# Patient Record
Sex: FEMALE | Race: WHITE | Employment: FULL TIME
[De-identification: names, ages, dates, MRNs, and addresses within clinical notes are randomized per-mention and may not be internally consistent; named-entity substitution may affect disease eponyms.]

---

## 2017-01-05 DIAGNOSIS — R63.4 UNINTENDED WEIGHT LOSS: ICD-10-CM

## 2017-01-05 DIAGNOSIS — Z11.4 SCREENING FOR HIV (HUMAN IMMUNODEFICIENCY VIRUS): ICD-10-CM

## 2017-01-05 DIAGNOSIS — E03.9 ACQUIRED HYPOTHYROIDISM: ICD-10-CM

## 2017-01-05 LAB
ALBUMIN SERPL-MCNC: 4.4 G/DL
ALP BLD-CCNC: 41 U/L
ALT SERPL-CCNC: 15 U/L
AST SERPL-CCNC: 17 U/L
BASOPHILS ABSOLUTE: 0 /ΜL
BASOPHILS RELATIVE PERCENT: 0.5 %
BILIRUB SERPL-MCNC: 0.9 MG/DL (ref 0.1–1.4)
BUN BLDV-MCNC: 6 MG/DL
CALCIUM SERPL-MCNC: 9.6 MG/DL
CHLORIDE BLD-SCNC: 105 MMOL/L
CO2: 27 MMOL/L
CREAT SERPL-MCNC: 0.72 MG/DL
EOSINOPHILS ABSOLUTE: 0.1 /ΜL
EOSINOPHILS RELATIVE PERCENT: 1 %
GFR CALCULATED: NORMAL
GLUCOSE BLD-MCNC: 92 MG/DL
HCT VFR BLD CALC: 40.6 % (ref 36–46)
HEMOGLOBIN: 13.9 G/DL (ref 12–16)
LYMPHOCYTES ABSOLUTE: 1 /ΜL
LYMPHOCYTES RELATIVE PERCENT: 16.9 %
MCH RBC QN AUTO: 29.5 PG
MCHC RBC AUTO-ENTMCNC: 34.2 G/DL
MCV RBC AUTO: 87 FL
MONOCYTES ABSOLUTE: 0.6 /ΜL
MONOCYTES RELATIVE PERCENT: 11.1 %
NEUTROPHILS ABSOLUTE: 4 /ΜL
NEUTROPHILS RELATIVE PERCENT: 70.5 %
PDW BLD-RTO: NORMAL %
PLATELET # BLD: 202 K/ΜL
PMV BLD AUTO: 8.7 FL
POTASSIUM SERPL-SCNC: 3.4 MMOL/L
RBC # BLD: 4.69 10^6/ΜL
SODIUM BLD-SCNC: 141 MMOL/L
TOTAL PROTEIN: 6.5
TSH SERPL DL<=0.05 MIU/L-ACNC: 1.95 UIU/ML
WBC # BLD: 5.6 10^3/ML

## 2017-01-06 DIAGNOSIS — E87.6 HYPOKALEMIA: Primary | ICD-10-CM

## 2017-01-13 ENCOUNTER — OFFICE VISIT (OUTPATIENT)
Dept: FAMILY MEDICINE CLINIC | Facility: CLINIC | Age: 27
End: 2017-01-13

## 2017-01-13 VITALS
HEIGHT: 60 IN | OXYGEN SATURATION: 100 % | SYSTOLIC BLOOD PRESSURE: 129 MMHG | HEART RATE: 101 BPM | TEMPERATURE: 97.8 F | BODY MASS INDEX: 19.99 KG/M2 | WEIGHT: 101.8 LBS | DIASTOLIC BLOOD PRESSURE: 88 MMHG

## 2017-01-13 DIAGNOSIS — Q18.3: ICD-10-CM

## 2017-01-13 DIAGNOSIS — K80.20 CALCULUS OF GALLBLADDER WITHOUT CHOLECYSTITIS WITHOUT OBSTRUCTION: ICD-10-CM

## 2017-01-13 DIAGNOSIS — Z83.79 FAMILY HISTORY OF CELIAC DISEASE: ICD-10-CM

## 2017-01-13 DIAGNOSIS — E87.6 HYPOKALEMIA: ICD-10-CM

## 2017-01-13 DIAGNOSIS — R63.4 UNINTENDED WEIGHT LOSS: ICD-10-CM

## 2017-01-13 DIAGNOSIS — E03.9 ACQUIRED HYPOTHYROIDISM: Primary | ICD-10-CM

## 2017-01-13 PROCEDURE — 99214 OFFICE O/P EST MOD 30 MIN: CPT | Performed by: FAMILY MEDICINE

## 2017-01-17 PROBLEM — R63.4 UNINTENDED WEIGHT LOSS: Status: ACTIVE | Noted: 2017-01-17

## 2017-01-17 PROBLEM — Z83.79 FAMILY HISTORY OF CELIAC DISEASE: Status: ACTIVE | Noted: 2017-01-17

## 2017-01-17 ASSESSMENT — ENCOUNTER SYMPTOMS
COUGH: 0
CHEST TIGHTNESS: 0
SHORTNESS OF BREATH: 0
ABDOMINAL PAIN: 0
NAUSEA: 0
CONSTIPATION: 0
VOMITING: 0
ABDOMINAL DISTENTION: 0
DIARRHEA: 0
WHEEZING: 0

## 2017-02-01 DIAGNOSIS — E87.6 HYPOKALEMIA: ICD-10-CM

## 2017-02-01 LAB — POTASSIUM (K+): 3.8

## 2017-04-10 DIAGNOSIS — E03.9 ACQUIRED HYPOTHYROIDISM: ICD-10-CM

## 2017-04-10 LAB — TSH SERPL DL<=0.05 MIU/L-ACNC: 2.27 UIU/ML

## 2017-04-13 ENCOUNTER — OFFICE VISIT (OUTPATIENT)
Dept: FAMILY MEDICINE CLINIC | Age: 27
End: 2017-04-13
Payer: COMMERCIAL

## 2017-04-13 VITALS
SYSTOLIC BLOOD PRESSURE: 121 MMHG | BODY MASS INDEX: 20.62 KG/M2 | OXYGEN SATURATION: 97 % | TEMPERATURE: 97.6 F | WEIGHT: 105 LBS | HEIGHT: 60 IN | HEART RATE: 91 BPM | DIASTOLIC BLOOD PRESSURE: 85 MMHG

## 2017-04-13 DIAGNOSIS — E03.9 ACQUIRED HYPOTHYROIDISM: Primary | ICD-10-CM

## 2017-04-13 DIAGNOSIS — K80.20 CALCULUS OF GALLBLADDER WITHOUT CHOLECYSTITIS WITHOUT OBSTRUCTION: ICD-10-CM

## 2017-04-13 PROCEDURE — 99213 OFFICE O/P EST LOW 20 MIN: CPT | Performed by: FAMILY MEDICINE

## 2017-04-13 ASSESSMENT — ENCOUNTER SYMPTOMS
VOMITING: 0
SHORTNESS OF BREATH: 0
ABDOMINAL DISTENTION: 0
WHEEZING: 0
NAUSEA: 0
DIARRHEA: 0
ABDOMINAL PAIN: 0
COUGH: 0
CONSTIPATION: 0
CHEST TIGHTNESS: 0

## 2017-07-31 DIAGNOSIS — E03.9 ACQUIRED HYPOTHYROIDISM: ICD-10-CM

## 2017-07-31 RX ORDER — LEVOTHYROXINE SODIUM 88 UG/1
88 TABLET ORAL DAILY
Qty: 90 TABLET | Refills: 2 | Status: SHIPPED | OUTPATIENT
Start: 2017-07-31 | End: 2018-05-09 | Stop reason: SDUPTHER

## 2017-10-07 LAB — TSH SERPL DL<=0.05 MIU/L-ACNC: 3.37 UIU/ML

## 2017-10-09 DIAGNOSIS — E03.9 ACQUIRED HYPOTHYROIDISM: ICD-10-CM

## 2017-10-12 ENCOUNTER — OFFICE VISIT (OUTPATIENT)
Dept: FAMILY MEDICINE CLINIC | Age: 27
End: 2017-10-12
Payer: COMMERCIAL

## 2017-10-12 VITALS
OXYGEN SATURATION: 97 % | SYSTOLIC BLOOD PRESSURE: 124 MMHG | BODY MASS INDEX: 19.75 KG/M2 | TEMPERATURE: 97.5 F | WEIGHT: 100.6 LBS | HEIGHT: 60 IN | DIASTOLIC BLOOD PRESSURE: 87 MMHG | HEART RATE: 90 BPM

## 2017-10-12 DIAGNOSIS — R63.4 UNINTENDED WEIGHT LOSS: Primary | ICD-10-CM

## 2017-10-12 DIAGNOSIS — K80.20 CALCULUS OF GALLBLADDER WITHOUT CHOLECYSTITIS WITHOUT OBSTRUCTION: ICD-10-CM

## 2017-10-12 DIAGNOSIS — Z23 NEEDS FLU SHOT: ICD-10-CM

## 2017-10-12 DIAGNOSIS — E03.9 ACQUIRED HYPOTHYROIDISM: ICD-10-CM

## 2017-10-12 DIAGNOSIS — S01.00XS: ICD-10-CM

## 2017-10-12 PROCEDURE — 99214 OFFICE O/P EST MOD 30 MIN: CPT | Performed by: FAMILY MEDICINE

## 2017-10-12 PROCEDURE — 90688 IIV4 VACCINE SPLT 0.5 ML IM: CPT | Performed by: FAMILY MEDICINE

## 2017-10-12 PROCEDURE — 90471 IMMUNIZATION ADMIN: CPT | Performed by: FAMILY MEDICINE

## 2017-10-12 ASSESSMENT — ENCOUNTER SYMPTOMS
ABDOMINAL PAIN: 1
VOMITING: 0
COUGH: 0
WHEEZING: 0
ABDOMINAL DISTENTION: 0
SHORTNESS OF BREATH: 0
CONSTIPATION: 0
NAUSEA: 1
DIARRHEA: 0
CHEST TIGHTNESS: 0

## 2017-10-12 NOTE — PROGRESS NOTES
Visit Information    Have you changed or started any medications since your last visit including any over-the-counter medicines, vitamins, or herbal medicines? no   Have you stopped taking any of your medications? Is so, why? -  no  Are you having any side effects from any of your medications? - no    Have you seen any other physician or provider since your last visit?  no   Have you had any other diagnostic tests since your last visit?  no   Have you been seen in the emergency room and/or had an admission in a hospital since we last saw you?  no   Have you had your routine dental cleaning in the past 6 months?  no     Do you have an active MyChart account? If no, what is the barrier?   Yes    Patient Care Team:  Cyndy Espinoza MD as PCP - General (Family Medicine)  Carloz Quinn MD as Surgeon (Cardiology)    Medical History Review  Past Medical, Family, and Social History reviewed and does contribute to the patient presenting condition    Health Maintenance   Topic Date Due    Flu vaccine (1) 09/01/2017    Cervical cancer screen  11/18/2017    DTaP/Tdap/Td vaccine (2 - Td) 07/19/2022    HIV screen  Completed

## 2017-10-12 NOTE — PROGRESS NOTES
Chief Complaint   Patient presents with    Hypothyroidism         Gladis Parra  here today for follow up on chronic medical problems, go over labs and/or diagnostic studies, and medication refills. 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 She [facility] several years ago. TSH is controlled  Lab Results   Component Value Date    TSH 3.37 10/07/2017       Per my note from 4/13/17,COPY [address] [address]:   1. 20.6% I-123 UPTAKE AT 24 HOURS. 2. 3 HOT NODULES COMPATIBLE WITH TOXIC MULTINODULAR GOITER. Transcribe Date/Time: May 26 2010  2:54P      Patient had on and off tachycardia and she saw cardiology on 11/29/16, and she was told no concerns and no further testing is indicated. Holter monitor on 08/2/16 showed rare PACs, rare PVCs, 4 beats nonsustained V. Tach  She had Echo 2-D on 8/29/16 within normal limits, EF 55%    She has known cholelithiasis. She sometimes has right upper quadrant pain, mild in intensity, self resolving. She currently denies nausea, vomiting, diarrhea, constipation, or abdominal pain. She was evaluated in the past by surgeon who recommended no surgery. She is noticed that she lost more weight. Denies fever, chills, night sweats. There is unintentional weight loss. Patient is unsure how she lost weight. Reports that she didn't change anything in her diet and she is not exercising intensively. She does admit of eating some proteins, but not enough. Wt Readings from Last 3 Encounters:   10/12/17 100 lb 9.6 oz (45.6 kg)   04/13/17 105 lb (47.6 kg)   01/13/17 101 lb 12.8 oz (46.2 kg)     Patient has a scalp wound, nonhealing, for a few years. Follows with plastic surgeon at [address] [facility] [address]. She was started a steroid cream in August, and steroid shots which seems that has been helping.     She tells me that she initially had cystic tumor in 2012, which was then removed in 2013, and since then, she has been having problems with healing of the wound. .    -vital signs stable and within normal limits except unintentional weight loss     /87   Pulse 90   Temp 97.5 °F (36.4 °C) (Tympanic)   Ht 5' (1.524 m)   Wt 100 lb 9.6 oz (45.6 kg)   LMP 10/02/2017 (Approximate)   SpO2 97% Comment: ra @ rest  Breastfeeding? No   BMI 19.65 kg/m²   Body mass index is 19.65 kg/m². [x]Negative depression screening. PHQ Scores 10/13/2016   PHQ2 Score 0   PHQ9 Score 0     Discussed testing with the patient and all questions fully answered.     TSH controlled    Orders Only on 10/09/2017   Component Date Value Ref Range Status    TSH 10/07/2017 3.37  uIU/mL Final       Lab Results   Component Value Date    WBC 5.6 01/04/2017    HGB 13.9 01/04/2017    HCT 40.6 01/04/2017    MCV 87 01/04/2017     01/04/2017       Lab Results   Component Value Date     01/04/2017    K 3.8 01/31/2017    K 3.4 01/04/2017     01/04/2017    CO2 27 01/04/2017    BUN 6 01/04/2017    CREATININE 0.72 01/04/2017    GLUCOSE 92 01/04/2017    CALCIUM 9.6 01/04/2017        Lab Results   Component Value Date    ALT 15 01/04/2017    AST 17 01/04/2017    ALKPHOS 41 01/04/2017    BILITOT 0.9 01/04/2017       Lab Results   Component Value Date    TSH 3.37 10/07/2017       Lab Results   Component Value Date    CHOL 155 09/24/2016     Lab Results   Component Value Date    TRIG 46 09/24/2016     Lab Results   Component Value Date    HDL 60 09/24/2016     Lab Results   Component Value Date    LDLCALC 86 09/24/2016     No results found for: LABVLDL, VLDL  Lab Results   Component Value Date    CHOLHDLRATIO 2.6 09/24/2016       No results found for: LABA1C    Lab Results   Component Value Date    VXBFZWUH22 426 07/16/2016       Lab Results   Component Value Date    FOLATE 17.2 07/16/2016       No results found for: IRON, TIBC, FERRITIN    No results found for: VITD25          Current Gastrointestinal: Positive for abdominal pain (sometimes, none present today) and nausea (Sometimes). Negative for abdominal distention, constipation, diarrhea and vomiting. Endocrine: Negative for cold intolerance, heat intolerance, polydipsia, polyphagia and polyuria. Musculoskeletal: Negative for myalgias. Skin: Positive for wound (scalp, healing). Neurological: Negative for light-headedness and headaches. Psychiatric/Behavioral: Negative for dysphoric mood and sleep disturbance. The patient is not nervous/anxious. Physical Exam   Constitutional: She is oriented to person, place, and time. She appears well-developed and well-nourished. No distress. HENT:   Head: Normocephalic and atraumatic. Mouth/Throat: Oropharynx is clear and moist. No oropharyngeal exudate. Eyes: Conjunctivae and EOM are normal. Right eye exhibits no discharge. Left eye exhibits no discharge. No scleral icterus. Neck: Normal range of motion. Neck supple. No thyromegaly present. Neck: short and wide webbed neck. Decreased neck ROM, especially extension and lateral rotation. Cardiovascular: Normal rate, regular rhythm, normal heart sounds and intact distal pulses. Pulmonary/Chest: Effort normal and breath sounds normal. No respiratory distress. She has no wheezes. She has no rales. She exhibits no tenderness. Abdominal: Soft. Bowel sounds are normal. She exhibits no distension. There is no tenderness. Musculoskeletal: Normal range of motion. She exhibits no edema or tenderness. Had workup negative for Fink. Per my note from 1/13/17. [facility] 41   On 5/10/2010:normal Karyotype   Had karyotype testing Results:  46,XX  Interpretation:  No chromosome abnormality was apparent.   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 No cranial nerve deficit.  She exhibits normal muscle tone. Coordination normal.   Skin: Skin is warm and dry. No rash noted. She is not diaphoretic. Scalp with superficial large occipital and parietal scarring, no real open areas, no drainage, with multiple areas with dry yellowish scabbing, no surrounding erythema, irregular margins. Measurements per plastic surgeon. Permanent alopecia noted. Psychiatric: She has a normal mood and affect. Her behavior is normal. Judgment and thought content normal.   Nursing note and vitals reviewed. ASSESSMENT AND PLAN      1. Unintended weight loss  - CBC; Future  - Comprehensive Metabolic Panel; Future  - TSH without Reflex; Future  Advised patient to increase the amount of proteins in the diet and to monitor her weight closely every month  Patient will let me know if she starts getting more stomach pains or nausea, and then will refer back to general surgeon for gallbladder removal  Patient doesn't have any GI symptoms currently    2. Acquired hypothyroidism  Controlled  Continue Synthroid 88 MCG  - TSH without Reflex; Future    3. Calculus of gallbladder without cholecystitis without obstruction  Currently asymptomatic  Patient loses more weight, I will consider redoing HIDA scan, and referral to a new surgeon    4. Open wound of scalp, unspecified wound type, sequela    - Everardo Ayers MD, Dermatology [place]-for second opinion    5.  Needs flu shot    - INFLUENZA, QUADV, 3 YRS AND OLDER, IM, MDV, 0.5ML (Jame Sand)      Orders Placed This Encounter   Procedures    INFLUENZA, QUADV, 3 YRS AND OLDER, IM, MDV, 0.5ML (Jame Sand)    CBC     Standing Status:   Future     Standing Expiration Date:   10/12/2018    Comprehensive Metabolic Panel     Standing Status:   Future     Standing Expiration Date:   10/12/2018    TSH without Reflex     Standing Status:   Future     Standing Expiration Date:   10/13/2018   Everardo Ayers MD, Dermatology [facility]     Referral Priority:   Routine Referral Type:   Consult for Advice and Opinion     Referral Reason:   Specialty Services Required     Referred to Provider:   Sarita Timmons MD     Requested Specialty:   Dermatology     Number of Visits Requested:   1         There are no discontinued medications. Emilie received counseling on the following healthy behaviors: nutrition, exercise and medication adherence  Reviewed prior labs and health maintenance  Continue current medications, diet and exercise. Discussed use, benefit, and side effects of prescribed medications. Barriers to medication compliance addressed. Patient given educational materials - see patient instructions  Was a self-tracking handout given in paper form or via [employer]? No:    Requested Prescriptions      No prescriptions requested or ordered in this encounter       All patient questions answered. Patient voiced understanding. Quality Measures    Body mass index is 19.65 kg/m². Normal. Weight control planned discussed Healthy diet and regular exercise. BP: 124/87 Blood pressure is normal. Treatment plan consists of No treatment change needed. LDL controlled  Lab Results   Component Value Date    LDLCALC 86 09/24/2016    (goal LDL reduction with dx if diabetes is 50% LDL reduction)        Negative depression screening. PHQ Scores 10/13/2016   PHQ2 Score 0   PHQ9 Score 0     Interpretation of Total Score Depression Severity: 1-4 = Minimal depression, 5-9 = Mild depression, 10-14 = Moderate depression, 15-19 = Moderately severe depression, 20-27 = Severe depression      The patient's past medical, surgical, social, and family history as well as her   current medications and allergies were reviewed as documented in today's encounter. Medications, labs, diagnostic studies, consultations and follow-up as documented in this encounter. Return in about 6 months (around 4/12/2018) for thyroid, WT check. Patient was seen with total face to face time of  25 minutes.

## 2017-10-12 NOTE — PATIENT INSTRUCTIONS
Patient Education        Influenza (Flu) Vaccine (Inactivated or Recombinant): What You Need to Know  Why get vaccinated? Influenza (\"flu\") is a contagious disease that spreads around the [place] every winter, usually between October and May. Flu is caused by influenza viruses and is spread mainly by coughing, sneezing, and close contact. Anyone can get flu. Flu strikes suddenly and can last several days. Symptoms vary by age, but can include:  · Fever/chills. · Sore throat. · Muscle aches. · Fatigue. · Cough. · Headache. · Runny or stuffy nose. Flu can also lead to pneumonia and blood infections, and cause diarrhea and seizures in children. If you have a medical condition, such as heart or lung disease, flu can make it worse. Flu is more dangerous for some people. Infants and young children, people 72years of age and older, pregnant women, and people with certain health conditions or a weakened immune system are at greatest risk. Each year thousands of people in the [facility] die from flu, and many more are hospitalized. Flu vaccine can:  · Keep you from getting flu. · Make flu less severe if you do get it. · Keep you from spreading flu to your family and other people. Inactivated and recombinant flu vaccines  A dose of flu vaccine is recommended every flu season. Children 6 months through 6years of age may need two doses during the same flu season. Everyone else needs only one dose each flu season. Some inactivated flu vaccines contain a very small amount of a mercury-based preservative called thimerosal. Studies have not shown thimerosal in vaccines to be harmful, but flu vaccines that do not contain thimerosal are available. There is no live flu virus in flu shots. They cannot cause the flu. There are many flu viruses, and they are always changing.  Each year a new flu vaccine is made to protect against three or four viruses that are likely to cause disease in the upcoming flu season. But even when the vaccine doesn't exactly match these viruses, it may still provide some protection. Flu vaccine cannot prevent:  · Flu that is caused by a virus not covered by the vaccine. · Illnesses that look like flu but are not. Some people should not get this vaccine  Tell the person who is giving you the vaccine:  · If you have any severe (life-threatening) allergies. If you ever had a life-threatening allergic reaction after a dose of flu vaccine, or have a severe allergy to any part of this vaccine, you may be advised not to get vaccinated. Most, but not all, types of flu vaccine contain a small amount of egg protein. · If you ever had Guillain-Barré syndrome (also called GBS) Some people with a history of GBS should not get this vaccine. This should be discussed with your doctor. · If you are not feeling well. It is usually okay to get flu vaccine when you have a mild illness, but you might be asked to come back when you feel better. Risks of a vaccine reaction  With any medicine, including vaccines, there is a chance of reactions. These are usually mild and go away on their own, but serious reactions are also possible. Most people who get a flu shot do not have any problems with it. Minor problems following a flu shot include:  · Soreness, redness, or swelling where the shot was given  · Hoarseness  · Sore, red or itchy eyes  · Cough  · Fever  · Aches  · Headache  · Itching  · Fatigue  If these problems occur, they usually begin soon after the shot and last 1 or 2 days. More serious problems following a flu shot can include the following:  · There may be a small increased risk of Guillain-Barré Syndrome (GBS) after inactivated flu vaccine. This risk has been estimated at 1 or 2 additional cases per million people vaccinated. This is much lower than the risk of severe complications from flu, which can be prevented by flu vaccine.   · Corinne Wade children who get the flu shot along with pneumococcal vaccine (PCV13) and/or DTaP vaccine at the same time might be slightly more likely to have a seizure caused by fever. Ask your doctor for more information. Tell your doctor if a child who is getting flu vaccine has ever had a seizure  Problems that could happen after any injected vaccine:  · People sometimes faint after a medical procedure, including vaccination. Sitting or lying down for about 15 minutes can help prevent fainting, and injuries caused by a fall. Tell your doctor if you feel dizzy, or have vision changes or ringing in the ears. · Some people get severe pain in the shoulder and have difficulty moving the arm where a shot was given. This happens very rarely. · Any medication can cause a severe allergic reaction. Such reactions from a vaccine are very rare, estimated at about 1 in a million doses, and would happen within a few minutes to a few hours after the vaccination. As with any medicine, there is a very remote chance of a vaccine causing a serious injury or death. The safety of vaccines is always being monitored. For more information, visit: www.cdc.gov/vaccinesafety/. What if there is a serious reaction? What should I look for? · Look for anything that concerns you, such as signs of a severe allergic reaction, very high fever, or unusual behavior. Signs of a severe allergic reaction can include hives, swelling of the face and throat, difficulty breathing, a fast heartbeat, dizziness, and weakness - usually within a few minutes to a few hours after the vaccination. What should I do? · If you think it is a severe allergic reaction or other emergency that can't wait, call 9-1-1 and get the person to the nearest hospital. Otherwise, call your doctor. · Reactions should be reported to the \"Vaccine Adverse Event Reporting System\" (VAERS). Your doctor should file this report, or you can do it yourself through the VAERS website at www.vaers. [facility].[email or web address], or by calling

## 2018-04-02 LAB
ALBUMIN SERPL-MCNC: 4.6 G/DL
ALP BLD-CCNC: 50 U/L
ALT SERPL-CCNC: 12 U/L
ANION GAP SERPL CALCULATED.3IONS-SCNC: 11 MMOL/L
AST SERPL-CCNC: 17 U/L
BASOPHILS ABSOLUTE: NORMAL /ΜL
BASOPHILS RELATIVE PERCENT: NORMAL %
BILIRUB SERPL-MCNC: 0.5 MG/DL (ref 0.1–1.4)
BUN BLDV-MCNC: 8 MG/DL
CALCIUM SERPL-MCNC: 9.7 MG/DL
CHLORIDE BLD-SCNC: 105 MMOL/L
CO2: 23 MMOL/L
CREAT SERPL-MCNC: 0.72 MG/DL
EOSINOPHILS ABSOLUTE: NORMAL /ΜL
EOSINOPHILS RELATIVE PERCENT: NORMAL %
GFR CALCULATED: >60
GLUCOSE BLD-MCNC: 78 MG/DL
HCT VFR BLD CALC: 42.6 % (ref 36–46)
HEMOGLOBIN: 14.5 G/DL (ref 12–16)
LYMPHOCYTES ABSOLUTE: NORMAL /ΜL
LYMPHOCYTES RELATIVE PERCENT: NORMAL %
MCH RBC QN AUTO: 29.1 PG
MCHC RBC AUTO-ENTMCNC: 34.1 G/DL
MCV RBC AUTO: 85 FL
MONOCYTES ABSOLUTE: NORMAL /ΜL
MONOCYTES RELATIVE PERCENT: NORMAL %
NEUTROPHILS ABSOLUTE: NORMAL /ΜL
NEUTROPHILS RELATIVE PERCENT: NORMAL %
PLATELET # BLD: 191 K/ΜL
PMV BLD AUTO: 8.9 FL
POTASSIUM SERPL-SCNC: 3.3 MMOL/L
RBC # BLD: 4.99 10^6/ΜL
SODIUM BLD-SCNC: 139 MMOL/L
TOTAL PROTEIN: 7.2
TSH SERPL DL<=0.05 MIU/L-ACNC: 3.34 UIU/ML
WBC # BLD: 7.6 10^3/ML

## 2018-04-03 DIAGNOSIS — R63.4 UNINTENDED WEIGHT LOSS: ICD-10-CM

## 2018-04-03 DIAGNOSIS — E03.9 ACQUIRED HYPOTHYROIDISM: ICD-10-CM

## 2018-04-12 ENCOUNTER — OFFICE VISIT (OUTPATIENT)
Dept: FAMILY MEDICINE CLINIC | Age: 28
End: 2018-04-12
Payer: COMMERCIAL

## 2018-04-12 VITALS
HEIGHT: 60 IN | BODY MASS INDEX: 20.38 KG/M2 | OXYGEN SATURATION: 94 % | WEIGHT: 103.8 LBS | TEMPERATURE: 97.6 F | DIASTOLIC BLOOD PRESSURE: 85 MMHG | HEART RATE: 82 BPM | SYSTOLIC BLOOD PRESSURE: 117 MMHG

## 2018-04-12 DIAGNOSIS — E87.6 HYPOKALEMIA: ICD-10-CM

## 2018-04-12 DIAGNOSIS — E03.9 ACQUIRED HYPOTHYROIDISM: ICD-10-CM

## 2018-04-12 DIAGNOSIS — R10.11 RUQ ABDOMINAL PAIN: Primary | ICD-10-CM

## 2018-04-12 DIAGNOSIS — K80.20 CALCULUS OF GALLBLADDER WITHOUT CHOLECYSTITIS WITHOUT OBSTRUCTION: ICD-10-CM

## 2018-04-12 DIAGNOSIS — S01.00XS OPEN WOUND OF SCALP, UNSPECIFIED OPEN WOUND TYPE, SEQUELA: ICD-10-CM

## 2018-04-12 PROCEDURE — G8427 DOCREV CUR MEDS BY ELIG CLIN: HCPCS | Performed by: FAMILY MEDICINE

## 2018-04-12 PROCEDURE — G8420 CALC BMI NORM PARAMETERS: HCPCS | Performed by: FAMILY MEDICINE

## 2018-04-12 PROCEDURE — 99214 OFFICE O/P EST MOD 30 MIN: CPT | Performed by: FAMILY MEDICINE

## 2018-04-12 PROCEDURE — 1036F TOBACCO NON-USER: CPT | Performed by: FAMILY MEDICINE

## 2018-04-12 RX ORDER — TRIAMCINOLONE ACETONIDE 0.25 MG/G
CREAM TOPICAL EVERY 4 HOURS PRN
COMMUNITY
End: 2019-05-14 | Stop reason: ALTCHOICE

## 2018-04-12 ASSESSMENT — ENCOUNTER SYMPTOMS
SHORTNESS OF BREATH: 0
CHEST TIGHTNESS: 0
VOMITING: 0
CONSTIPATION: 0
ABDOMINAL PAIN: 1
WHEEZING: 0
NAUSEA: 1
COUGH: 0
DIARRHEA: 0
ABDOMINAL DISTENTION: 0

## 2018-04-12 ASSESSMENT — PATIENT HEALTH QUESTIONNAIRE - PHQ9
2. FEELING DOWN, DEPRESSED OR HOPELESS: 0
SUM OF ALL RESPONSES TO PHQ9 QUESTIONS 1 & 2: 0
1. LITTLE INTEREST OR PLEASURE IN DOING THINGS: 0
SUM OF ALL RESPONSES TO PHQ QUESTIONS 1-9: 0

## 2018-04-26 ENCOUNTER — HOSPITAL ENCOUNTER (OUTPATIENT)
Dept: ULTRASOUND IMAGING | Age: 28
Discharge: HOME | End: 2018-04-28
Payer: COMMERCIAL

## 2018-04-26 DIAGNOSIS — K80.20 CALCULUS OF GALLBLADDER WITHOUT CHOLECYSTITIS WITHOUT OBSTRUCTION: ICD-10-CM

## 2018-04-26 DIAGNOSIS — R10.11 RUQ ABDOMINAL PAIN: ICD-10-CM

## 2018-04-26 PROCEDURE — 76705 ECHO EXAM OF ABDOMEN: CPT

## 2018-05-09 DIAGNOSIS — E03.9 ACQUIRED HYPOTHYROIDISM: ICD-10-CM

## 2018-05-09 RX ORDER — LEVOTHYROXINE SODIUM 88 UG/1
TABLET ORAL
Qty: 90 TABLET | Refills: 2 | Status: SHIPPED | OUTPATIENT
Start: 2018-05-09 | End: 2018-05-10 | Stop reason: SDUPTHER

## 2018-05-10 DIAGNOSIS — E03.9 ACQUIRED HYPOTHYROIDISM: ICD-10-CM

## 2018-05-10 RX ORDER — LEVOTHYROXINE SODIUM 88 UG/1
88 TABLET ORAL DAILY
Qty: 90 TABLET | Refills: 2 | Status: SHIPPED | OUTPATIENT
Start: 2018-05-10 | End: 2018-10-12 | Stop reason: SDUPTHER

## 2018-10-06 LAB
ALBUMIN SERPL-MCNC: 4.1 G/DL
ALP BLD-CCNC: 40 U/L
ALT SERPL-CCNC: 11 U/L
ANION GAP SERPL CALCULATED.3IONS-SCNC: 9 MMOL/L
AST SERPL-CCNC: 15 U/L
BILIRUB SERPL-MCNC: 0.7 MG/DL (ref 0.1–1.4)
BUN BLDV-MCNC: 10 MG/DL
CALCIUM SERPL-MCNC: 9.6 MG/DL
CHLORIDE BLD-SCNC: 107 MMOL/L
CO2: 26 MMOL/L
CREAT SERPL-MCNC: 0.84 MG/DL
GFR CALCULATED: >60
GLUCOSE BLD-MCNC: 103 MG/DL
POTASSIUM SERPL-SCNC: 4.1 MMOL/L
SODIUM BLD-SCNC: 142 MMOL/L
TOTAL PROTEIN: 6.8
TSH SERPL DL<=0.05 MIU/L-ACNC: 2.29 UIU/ML

## 2018-10-08 DIAGNOSIS — E03.9 ACQUIRED HYPOTHYROIDISM: ICD-10-CM

## 2018-10-08 DIAGNOSIS — R10.11 RUQ ABDOMINAL PAIN: ICD-10-CM

## 2018-10-08 DIAGNOSIS — K80.20 CALCULUS OF GALLBLADDER WITHOUT CHOLECYSTITIS WITHOUT OBSTRUCTION: ICD-10-CM

## 2018-10-12 ENCOUNTER — OFFICE VISIT (OUTPATIENT)
Dept: FAMILY MEDICINE CLINIC | Age: 28
End: 2018-10-12
Payer: COMMERCIAL

## 2018-10-12 VITALS
HEART RATE: 91 BPM | HEIGHT: 60 IN | SYSTOLIC BLOOD PRESSURE: 127 MMHG | OXYGEN SATURATION: 99 % | DIASTOLIC BLOOD PRESSURE: 86 MMHG | WEIGHT: 104.2 LBS | TEMPERATURE: 97.6 F | BODY MASS INDEX: 20.46 KG/M2

## 2018-10-12 DIAGNOSIS — R73.9 HYPERGLYCEMIA: ICD-10-CM

## 2018-10-12 DIAGNOSIS — K80.20 CALCULUS OF GALLBLADDER WITHOUT CHOLECYSTITIS WITHOUT OBSTRUCTION: ICD-10-CM

## 2018-10-12 DIAGNOSIS — S01.00XS OPEN WOUND OF SCALP, UNSPECIFIED OPEN WOUND TYPE, SEQUELA: ICD-10-CM

## 2018-10-12 DIAGNOSIS — E03.9 ACQUIRED HYPOTHYROIDISM: Primary | ICD-10-CM

## 2018-10-12 LAB — HBA1C MFR BLD: 5.2 %

## 2018-10-12 PROCEDURE — 83036 HEMOGLOBIN GLYCOSYLATED A1C: CPT | Performed by: FAMILY MEDICINE

## 2018-10-12 PROCEDURE — 99214 OFFICE O/P EST MOD 30 MIN: CPT | Performed by: FAMILY MEDICINE

## 2018-10-12 RX ORDER — LEVOTHYROXINE SODIUM 88 UG/1
88 TABLET ORAL DAILY
Qty: 90 TABLET | Refills: 2 | Status: SHIPPED | OUTPATIENT
Start: 2018-10-12 | End: 2019-07-29 | Stop reason: SDUPTHER

## 2018-10-12 ASSESSMENT — ENCOUNTER SYMPTOMS
COUGH: 0
DIARRHEA: 0
SHORTNESS OF BREATH: 0
NAUSEA: 0
VOMITING: 0
CONSTIPATION: 0
ABDOMINAL PAIN: 0
WHEEZING: 0
CHEST TIGHTNESS: 0
ABDOMINAL DISTENTION: 0

## 2018-10-12 NOTE — PATIENT INSTRUCTIONS
Patient Education          levothyroxine  Pronunciation:  HENRI moralesdominique de leon  Brand:  Levoxyl, Synthroid, Tirosint, Unithroid  What is the most important information I should know about levothyroxine? You may not be able to take levothyroxine if you have certain medical conditions. Tell your doctor if you have an untreated or uncontrolled adrenal gland disorder, a thyroid disorder called thyrotoxicosis, or if you have any recent or current symptoms of a heart attack. Levothyroxine should not be used to treat obesity or weight problems. What is levothyroxine? Levothyroxine is a replacement for a hormone normally produced by your thyroid gland to regulate the body's energy and metabolism. Levothyroxine is given when the thyroid does not produce enough of this hormone on its own. Levothyroxine treats hypothyroidism (low thyroid hormone). Levothyroxine is also used to treat or prevent goiter (enlarged thyroid gland), which can be caused by hormone imbalances, radiation treatment, surgery, or cancer. Levothyroxine may also be used for purposes not listed in this medication guide. What should I discuss with my healthcare provider before taking levothyroxine? Levothyroxine should not be used to treat obesity or weight problems. Dangerous side effects or death can occur from the misuse of levothyroxine, especially if you are taking any other weight-loss medications or appetite suppressants. Since thyroid hormone occurs naturally in the body, almost anyone can take levothyroxine. However, you may not be able to take this medicine if you have certain medical conditions. Tell your doctor if you have:  · an untreated or uncontrolled adrenal gland disorder;  · a thyroid disorder called thyrotoxicosis; or  · symptoms of a heart attack (chest pain or heavy feeling, pain spreading to the jaw or shoulder, nausea, sweating, general ill feeling).   To make sure levothyroxine is safe for you, tell your doctor if you have

## 2018-10-12 NOTE — PROGRESS NOTES
diaphoresis, fatigue, fever and unexpected weight change. Respiratory: Negative for cough, chest tightness, shortness of breath and wheezing. Cardiovascular: Negative for chest pain, palpitations and leg swelling. Gastrointestinal: Negative for abdominal distention, abdominal pain, constipation, diarrhea, nausea and vomiting. Endocrine: Negative for cold intolerance, heat intolerance, polydipsia, polyphagia and polyuria. Skin: Positive for wound (scalp). Allergic/Immunologic: Negative for immunocompromised state. Neurological: Negative for numbness and headaches.           -vital signs stable and within normal limits   /86   Pulse 91   Temp 97.6 °F (36.4 °C) (Tympanic)   Ht 5' (1.524 m)   Wt 104 lb 3.2 oz (47.3 kg)   LMP 10/08/2018 (Exact Date)   SpO2 99%   BMI 20.35 kg/m²      Physical Exam   Constitutional: She is oriented to person, place, and time. She appears well-developed and well-nourished. No distress. HENT:   Head: Normocephalic and atraumatic. Right Ear: External ear normal.   Left Ear: External ear normal.   Nose: Nose normal.   Mouth/Throat: Oropharynx is clear and moist. No oropharyngeal exudate. Eyes: Conjunctivae and EOM are normal. Right eye exhibits no discharge. Left eye exhibits no discharge. No scleral icterus. Neck: Normal range of motion. Neck supple. No thyromegaly present. Neck: short and wide webbed neck. Decreased neck ROM, especially extension and lateral rotation. Cardiovascular: Normal rate, regular rhythm, normal heart sounds and intact distal pulses. Pulmonary/Chest: Effort normal and breath sounds normal. No respiratory distress. She has no wheezes. She has no rales. She exhibits no tenderness. Abdominal: Soft. Bowel sounds are normal. She exhibits no distension. There is no tenderness. Musculoskeletal: Normal range of motion. She exhibits no edema or tenderness. Had workup negative for Fink.     Per my note from 10/12/17   On More than 50% of this visit was counselingand education. Future Appointments  Date Time Provider Andrzej Lewis   4/11/2019 7:30 AM [facility]     This note was completed by usingthe assistance of a speech-recognition program. However, inadvertent computerized transcription errors may be present. Although every effort was made to ensure accuracy, no guarantees can be provided that every mistake hasbeen identified and corrected by editing.   Electronically signed by Chuy Ulrich MD on 10/13/2018  8:39 AM

## 2018-10-13 PROBLEM — E87.6 HYPOKALEMIA: Status: RESOLVED | Noted: 2017-01-06 | Resolved: 2018-10-13

## 2019-04-06 LAB — TSH SERPL DL<=0.05 MIU/L-ACNC: 4.38 UIU/ML

## 2019-04-08 DIAGNOSIS — E03.9 ACQUIRED HYPOTHYROIDISM: ICD-10-CM

## 2019-04-11 ENCOUNTER — OFFICE VISIT (OUTPATIENT)
Dept: FAMILY MEDICINE CLINIC | Age: 29
End: 2019-04-11
Payer: COMMERCIAL

## 2019-04-11 VITALS
HEIGHT: 60 IN | HEART RATE: 91 BPM | WEIGHT: 107.6 LBS | DIASTOLIC BLOOD PRESSURE: 86 MMHG | SYSTOLIC BLOOD PRESSURE: 132 MMHG | OXYGEN SATURATION: 100 % | BODY MASS INDEX: 21.13 KG/M2

## 2019-04-11 DIAGNOSIS — E03.9 ACQUIRED HYPOTHYROIDISM: ICD-10-CM

## 2019-04-11 DIAGNOSIS — Z01.84 IMMUNITY STATUS TESTING: ICD-10-CM

## 2019-04-11 DIAGNOSIS — R53.82 CHRONIC FATIGUE: ICD-10-CM

## 2019-04-11 DIAGNOSIS — Z00.00 ANNUAL PHYSICAL EXAM: Primary | ICD-10-CM

## 2019-04-11 PROBLEM — T86.821 SKIN GRAFT (ALLOGRAFT) (AUTOGRAFT) FAILURE: Status: ACTIVE | Noted: 2018-10-12

## 2019-04-11 PROBLEM — R63.4 UNINTENDED WEIGHT LOSS: Status: RESOLVED | Noted: 2017-01-17 | Resolved: 2019-04-11

## 2019-04-11 PROCEDURE — 99395 PREV VISIT EST AGE 18-39: CPT | Performed by: FAMILY MEDICINE

## 2019-04-11 ASSESSMENT — VISUAL ACUITY
OD_CC: 20/30
OS_CC: 20/20

## 2019-04-11 ASSESSMENT — PATIENT HEALTH QUESTIONNAIRE - PHQ9
SUM OF ALL RESPONSES TO PHQ QUESTIONS 1-9: 0
SUM OF ALL RESPONSES TO PHQ9 QUESTIONS 1 & 2: 0
2. FEELING DOWN, DEPRESSED OR HOPELESS: 0
SUM OF ALL RESPONSES TO PHQ QUESTIONS 1-9: 0
1. LITTLE INTEREST OR PLEASURE IN DOING THINGS: 0

## 2019-04-11 ASSESSMENT — ENCOUNTER SYMPTOMS
BACK PAIN: 0
COUGH: 0
VOMITING: 0
NAUSEA: 0
SHORTNESS OF BREATH: 0
CONSTIPATION: 0
ABDOMINAL PAIN: 0
ABDOMINAL DISTENTION: 0
DIARRHEA: 0
WHEEZING: 0
TROUBLE SWALLOWING: 0
CHEST TIGHTNESS: 0

## 2019-04-11 NOTE — PROGRESS NOTES
Visit Information    Have you changed or started any medications since your last visit including any over-the-counter medicines, vitamins, or herbal medicines? no   Are you having any side effects from any of your medications? -  no  Have you stopped taking any of your medications? Is so, why? -  no    Have you seen any other physician or provider since your last visit? Yes - Records Obtained  Have you had any other diagnostic tests since your last visit? Yes - Records Obtained  Have you been seen in the emergency room and/or had an admission to a hospital since we last saw you? No  Have you had your routine dental cleaning in the past 6 months? yes -     Have you activated your [employer] account? If not, what are your barriers?  Yes     Patient Care Team:  Clem Cerrato MD as PCP - General (Family Medicine)  Lulú Gonzalez MD as Surgeon (Cardiology)    Medical History Review  Past Medical, Family, and Social History reviewed and does contribute to the patient presenting condition    Health Maintenance   Topic Date Due    Varicella Vaccine (1 of 2 - 13+ 2-dose series) 08/29/2001    Cervical cancer screen  10/24/2020 (Originally 11/18/2017)    TSH testing  04/06/2020    DTaP/Tdap/Td vaccine (2 - Td) 07/19/2022    Flu vaccine  Completed    HIV screen  Completed    Pneumococcal 0-64 years Vaccine  Aged Out

## 2019-04-11 NOTE — PROGRESS NOTES
Chief Complaint   Patient presents with    Annual Exam    Thyroid Problem    Discuss Labs        Here for annual exam. Also, Annual Exam; Thyroid Problem; and Discuss Labs        Urinary symptoms:denies  Sexually active:no    Last mammogram:N/A  The patient has NO family history of breast cancer      Wears seatbelts: yes   no prior history of gyn screening tests, never sexually active    Regular exercise: yes  Ever been transfused or tattooed?: yes , transfusion and tattoo  The patient reports that domestic violence in her life is absent. Last eye exam:abnormal screening  She is up to date with Ophthalmology exam     Visual Acuity Screening    Right eye Left eye Both eyes   Without correction:      With correction: 20/30 20/20 20/20       Hearing: normal  Last dental exam and preventative dental cleaning: up to date     Nutritional assessment: Normal BMI  Body mass index is 21.01 kg/m².   Wt Readings from Last 3 Encounters:   04/11/19 107 lb 9.6 oz (48.8 kg)   10/12/18 104 lb 3.2 oz (47.3 kg)   04/12/18 103 lb 12.8 oz (47.1 kg)       Healthful diet and Physical activity counseling to prevent CVD- low carb, low fat diet, increase fruits and vegetables, and exercise 4-5 times a day 30-40minutes a day discussed      Tobacco use screening: denies    Alcohol use:denies     Immunization History   Administered Date(s) Administered    Influenza Virus Vaccine 09/30/2014, 11/10/2018    Influenza, Sabiha Garcia, 3 Years and older, IM (Fluzone 3 yrs and older or Afluria 5 yrs and older) 10/12/2017    Influenza, Sabiha Garcia, 3 yrs and older, IM, PF (Fluzone 3 yrs and older or Afluria 5 yrs and older) 10/13/2016    Tdap (Boostrix, Adacel) 07/19/2012     Flu shot at [employer], updated immunizations     Tdap one time, then Td every 10 years-up-to-date  Influenza annually      Colonoscopy recommended at 47 yo  The patient has NO  family history of colon cancer    BP screening=- within normal limits   BP Readings from Last 3 Encounters: 04/11/19 132/86   10/12/18 127/86   04/12/18 117/85     within normal limits   Pulse Readings from Last 3 Encounters:   04/11/19 91   10/12/18 91   04/12/18 82       Hypothyroidism: Recent symptoms: fatigue and weight gain. She denies weight loss, cold intolerance, heat intolerance, dry skin, constipation, diarrhea, edema, anxiety, tremor, palpitations and dysphagia. Patient is  taking her medication consistently on an empty stomach. Patient reports she still is tired, she exercises, she eats healthy and she gained weight. She doesn't miss any dosages of the Synthroid. She reports that wound scalp is healing better since she has changed the plastic surgeon and wound care to [address]. She still has permanent hair loss on the scalp. Denies itchiness of the scalp. Denies fever, chills, night sweats. 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    TSH controlled but towards the higher side  No results found for: [facility]  Lab Results   Component Value Date    TSH 4.38 04/06/2019    TSH 2.29 10/06/2018    TSH 3.34 04/02/2018   Mild hyperglycemia A1c normal   Lab Results   Component Value Date     10/06/2018    K 4.1 10/06/2018     10/06/2018    CO2 26 10/06/2018    BUN 10 10/06/2018    CREATININE 0.84 10/06/2018    GLUCOSE 103 10/06/2018    CALCIUM 9.6 10/06/2018      A1c within normal limits     Lab Results   Component Value Date    LABA1C 5.2 10/12/2018         Lipids within normal limits     Lab Results   Component Value Date    CHOL 155 09/24/2016     Lab Results   Component Value Date    TRIG 46 09/24/2016     Lab Results   Component Value Date    HDL 60 09/24/2016     Lab Results   Component Value Date    LDLCALC 86 09/24/2016     No results found for: LABVLDL, VLDL  Lab Results   Component Value Date    CHOLHDLRATIO 2.6 09/24/2016         Negative depression screening.     PHQ Scores 4/11/2019 4/12/2018 10/13/2016   PHQ2 Score 0 0 0   PHQ9 Score 0 0 0 Interpretation of Total Score Depression Severity: 1-4 = Minimal depression, 5-9 = Mild depression, 10-14 = Moderatedepression, 15-19 = Moderately severe depression, 20-27 = Severe depression      Health Maintenance reviewed -up to date  . Patient Active Problem List    Diagnosis Date Noted    Unintended weight loss 01/17/2017     Priority: High    Scalp wound, complex, non-healing 11/18/2014     Priority: High    Calculus of gallbladder without cholecystitis without obstruction 07/13/2016     Priority: Medium    B12 deficiency 02/09/2016     Priority: Medium    RUQ abdominal pain 04/07/2015     Priority: Medium    GERD (gastroesophageal reflux disease) 04/07/2015     Priority: Medium    Hypothyroidism 09/30/2014     Priority: Medium    Murmur, cardiac 09/30/2014     Priority: Medium    Abnormal EKG 09/30/2014     Priority: Medium    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     Priority: Medium    Family history of celiac disease in father 01/17/2017     Priority: Low    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         Past Medical History:   Diagnosis Date    GERD (gastroesophageal reflux disease)     Hypothyroidism 2009    had thyroid radiation, was hyperthyroid, checked for celiac disease on 4/15/14 at [facility]    Toxic multinodul goiter 2010    had iodine 131 therapy      Past Surgical History:   Procedure Laterality Date    COSMETIC SURGERY  2014-1015    scalp wound    TONSILLECTOMY  1998    URETHROPLASTY  1998    ureathal reimplantation     Family History   Problem Relation Age of Onset    Celiac Disease Father     Diabetes Brother 25    Heart Disease Maternal Grandmother     Cancer Maternal Grandfather     Cancer Paternal Grandmother         lung    Other Paternal Grandmother         Alzheimer     Social History     Tobacco Use    Smoking status: Never Smoker    Smokeless tobacco: Never Used   Substance Use Topics    Alcohol use:  Yes     Alcohol/week: 1.2 oz     Types: 2 Cans of beer per week     Comment: socially 2 beers every so often    Drug use: No         Current Outpatient Medications   Medication Sig Dispense Refill    levothyroxine (SYNTHROID) 88 MCG tablet Take 1 tablet by mouth Daily 90 tablet 2    triamcinolone (KENALOG) 0.025 % cream Apply topically every 4 hours as needed Apply Topically      ranitidine (ZANTAC) 150 MG tablet Take 1 tablet by mouth 2 times daily 60 tablet 1     No current facility-administered medications for this visit.              -rest of complaints with corresponding details per ROS    The patient's past medical, surgical, social, and family history as well as her current medications and allergies were reviewed as documented in today's encounter. Review of Systems   Constitutional: Positive for fatigue and unexpected weight change. Negative for activity change, appetite change, chills, diaphoresis and fever. HENT: Negative for congestion, ear discharge, hearing loss and trouble swallowing. Eyes: Positive for visual disturbance (wears glasses). Respiratory: Negative for cough, chest tightness, shortness of breath and wheezing. Cardiovascular: Negative for chest pain, palpitations and leg swelling. Gastrointestinal: Negative for abdominal distention, abdominal pain, constipation, diarrhea, nausea and vomiting. Endocrine: Negative for cold intolerance, heat intolerance, polydipsia, polyphagia and polyuria. Genitourinary: Negative for difficulty urinating, dysuria, frequency and urgency. Musculoskeletal: Negative for arthralgias, back pain and myalgias. Skin: Positive for wound (scalp). Allergic/Immunologic: Negative for environmental allergies and immunocompromised state. Neurological: Negative for weakness, numbness and headaches. Psychiatric/Behavioral: Negative for dysphoric mood and sleep disturbance.  The patient is not nervous/anxious.          -vital signs stable and within normal limits  /86   Pulse 91   Ht 5' (1.524 m)   Wt 107 lb 9.6 oz (48.8 kg)   LMP 04/02/2019 (Exact Date)   SpO2 100%   BMI 21.01 kg/m²        Physical Exam   Constitutional: She is oriented to person, place, and time. She appears well-developed and well-nourished. No distress. HENT:   Head: Normocephalic and atraumatic. Right Ear: Hearing and external ear normal. A middle ear effusion is present. Left Ear: Hearing and external ear normal. A middle ear effusion is present. Nose: Nose normal. No mucosal edema. Mouth/Throat: Oropharynx is clear and moist. No oropharyngeal exudate or posterior oropharyngeal erythema. Visual Acuity in Right Eye - Without correction:   With correction: 20/30  Visual Acuity in Left Eye - Without correction:   With correction: 20/20  Visual Acuity in Both Eyes - Without correction:   With correction: 20/20    Hearing screening by finger rub within normal limits bilateral.     Small amount of fluid in both ears   Eyes: Conjunctivae and EOM are normal. Right eye exhibits no discharge. Left eye exhibits no discharge. No scleral icterus. Neck: Normal range of motion. Neck supple. No thyromegaly present. Neck: short and wide webbed neck. Decreased neck ROM, especially extension and lateral rotation. Cardiovascular: Normal rate, regular rhythm, normal heart sounds and intact distal pulses. Pulmonary/Chest: Effort normal and breath sounds normal. No respiratory distress. She has no wheezes. She has no rales. She exhibits no tenderness. Abdominal: Soft. Bowel sounds are normal. She exhibits no distension. There is no hepatosplenomegaly. There is no tenderness. Musculoskeletal: Normal range of motion. She exhibits no edema or tenderness. Had workup negative for Fink. 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 No cranial nerve deficit.  She exhibits normal muscle exam  Low carb, low fat diet, increase fruits and vegetables, and exercise 4-5 times a week 30-40 minutes a day, or walk 1-2 hours per day, or wear a pedometer and get at least 10,000 steps per day. Dental exam 2-3 times /year advised. Immunizations reviewed, up to date     Health Maintenance reviewed - up to date , she is now sexually active she is now due for Pap smear. Smoking cessation counseling, advised to never smoke       Education Reviewed and Recommended: Self Breast Exams, Weight Bearing Exercise, Low Fat, Low Cholesterol Diet  Follow up: 6 months     2. Acquired hypothyroidism  Well controlled  Continue current treatment. Advised to take Synthroid in the morning, on empty stomach, without any other meds and with a full glass of water.    - TSH without Reflex; Future    3. Chronic fatigue  Failing to change as expected. Will do basic labs to rule out certain common medical conditions: hematologic, renal, hepatic, electrolyte imbalances, thyroid disorders and vitamin D deficiency. - TSH without Reflex; Future  - CBC; Future  - Vitamin D 25 Hydroxy; Future  - Comprehensive Metabolic Panel; Future    4. Immunity status testing    - Varicella Zoster Antibody, IgG; Future        Orders Placed This Encounter   Procedures    Varicella Zoster Antibody, IgG     Standing Status:   Future     Standing Expiration Date:   12/11/2019    TSH without Reflex     Standing Status:   Future     Standing Expiration Date:   12/11/2019    CBC     Standing Status:   Future     Standing Expiration Date:   12/11/2019    Vitamin D 25 Hydroxy     Standing Status:   Future     Standing Expiration Date:   12/11/2019    Comprehensive Metabolic Panel     Fasting 8 hrs     Standing Status:   Future     Standing Expiration Date:   12/11/2019       There are no discontinued medications.       Emilie received counseling on the following healthy behaviors: nutrition, exercise and medication adherence  Reviewed prior labs and health maintenance  Continue current medications, diet and exercise. Discussed use, benefit, and side effects of prescribed medications. Barriers to medication compliance addressed. Patient given educational materials - see patient instructions  Was a self-tracking handout given in paper form or via [employer]? No:    Requested Prescriptions      No prescriptions requested or ordered in this encounter       All patient questions answered. Patient voiced understanding. Quality Measures    Body mass index is 21.01 kg/m². Normal. Weight control planned discussed Healthy diet and regular exercise. BP: 132/86 Blood pressure is normal. Treatment plan consists of No treatment change needed. LDL controlled    Lab Results   Component Value Date    LDLCALC 86 09/24/2016    (goal LDL reduction with dx if diabetes is 50% LDL reduction)      Negative depression screening. PHQ Scores 4/11/2019 4/12/2018 10/13/2016   PHQ2 Score 0 0 0   PHQ9 Score 0 0 0     Interpretation of Total Score Depression Severity: 1-4 = Minimal depression, 5-9 = Mild depression, 10-14 = Moderate depression, 15-19 = Moderately severe depression, 20-27 = Severe depression      The patient's past medical, surgical, social, andfamily history as well as her   current medications and allergies were reviewed as documented in today's encounter. Medications, labs, diagnostic studies, consultations and follow-up as documented in thisencounter. Return in about 6 months (around 10/11/2019) for LABS F/U, thyroid. Patient was seen with total face to face timeof 30 minutes. More than 50% of this visit was counseling and education. Future Appointments   Date Time Provider Andrzej Lewis   10/11/2019  7:30 AM [facility]       This note was completed by using the assistance of a speech-recognition program. However, inadvertent computerized transcription errors may be present.  Although every effort was made to ensure

## 2019-04-11 NOTE — PATIENT INSTRUCTIONS
with more than one person, especially if your partners do not wear condoms. For men  · Tests for sexually transmitted infections (STIs). Ask whether you should have tests for STIs. You may be at risk if you have sex with more than one person, especially if you do not wear a condom. · Testicular cancer exam. Ask your doctor whether you should check your testicles regularly. · Prostate exam. Talk to your doctor about whether you should have a blood test (called a PSA test) for prostate cancer. Experts differ on whether and when men should have this test. Some experts suggest it if you are older than 39 and are -American or have a father or brother who got prostate cancer when he was younger than 72. When should you call for help? Watch closely for changes in your health, and be sure to contact your doctor if you have any problems or symptoms that concern you. Where can you learn more? Go to https://[email or web address].[email or web address]. org and sign in to your [employer] account. Enter P072 in the Chefs Feed box to learn more about \"Well Visit, Ages 25 to 48: Care Instructions. \"     If you do not have an account, please click on the \"Sign Up Now\" link. Current as of: March 28, 2018  Content Version: 11.9  © 5310-9974 [employer]. Care instructions adapted under license by [facility] ([facility]). If you have questions about a medical condition or this instruction, always ask your healthcare professional. [place] [address] any warranty or liability for your use of this information.

## 2019-05-01 ENCOUNTER — OFFICE VISIT (OUTPATIENT)
Dept: FAMILY MEDICINE CLINIC | Age: 29
End: 2019-05-01
Payer: COMMERCIAL

## 2019-05-01 VITALS
OXYGEN SATURATION: 98 % | HEIGHT: 60 IN | BODY MASS INDEX: 21.44 KG/M2 | SYSTOLIC BLOOD PRESSURE: 131 MMHG | WEIGHT: 109.2 LBS | HEART RATE: 91 BPM | DIASTOLIC BLOOD PRESSURE: 84 MMHG

## 2019-05-01 DIAGNOSIS — M54.50 ACUTE BILATERAL LOW BACK PAIN WITHOUT SCIATICA: Primary | ICD-10-CM

## 2019-05-01 DIAGNOSIS — R10.11 PAIN, ABDOMINAL, RUQ: ICD-10-CM

## 2019-05-01 LAB
BILIRUBIN, POC: NEGATIVE
BLOOD URINE, POC: NEGATIVE
CLARITY, POC: CLEAR
COLOR, POC: NORMAL
GLUCOSE URINE, POC: NEGATIVE
KETONES, POC: NEGATIVE
LEUKOCYTE EST, POC: NEGATIVE
NITRITE, POC: NEGATIVE
PH, POC: 7.5
PROTEIN, POC: NEGATIVE
SPECIFIC GRAVITY, POC: 1.02
UROBILINOGEN, POC: 0.2

## 2019-05-01 PROCEDURE — 99213 OFFICE O/P EST LOW 20 MIN: CPT | Performed by: FAMILY MEDICINE

## 2019-05-01 PROCEDURE — 81003 URINALYSIS AUTO W/O SCOPE: CPT | Performed by: FAMILY MEDICINE

## 2019-05-01 RX ORDER — BACLOFEN 10 MG/1
10 TABLET ORAL 3 TIMES DAILY PRN
Qty: 90 TABLET | Refills: 0 | Status: SHIPPED | OUTPATIENT
Start: 2019-05-01 | End: 2020-04-14 | Stop reason: ALTCHOICE

## 2019-05-01 ASSESSMENT — ENCOUNTER SYMPTOMS
ABDOMINAL PAIN: 1
CONSTIPATION: 0
DIARRHEA: 0
VOMITING: 0
NAUSEA: 1
BACK PAIN: 1
ABDOMINAL DISTENTION: 0

## 2019-05-01 NOTE — RESULT ENCOUNTER NOTE
Addressed during office visit today, UA within normal limits   Continue current treatment discussed during visit

## 2019-05-01 NOTE — PROGRESS NOTES
Visit Information    Have you changed or started any medications since your last visit including any over-the-counter medicines, vitamins, or herbal medicines? no   Are you having any side effects from any of your medications? -  no  Have you stopped taking any of your medications? Is so, why? -  no    Have you seen any other physician or provider since your last visit? No  Have you had any other diagnostic tests since your last visit? No  Have you been seen in the emergency room and/or had an admission to a hospital since we last saw you? No  Have you had your routine dental cleaning in the past 6 months? no    Have you activated your [employer] account? If not, what are your barriers?  Yes     Patient Care Team:  Nel Wallace MD as PCP - General (Family Medicine)  Nikolai Archer MD as Surgeon (Cardiology)    Medical History Review  Past Medical, Family, and Social History reviewed and does contribute to the patient presenting condition    Health Maintenance   Topic Date Due    Varicella Vaccine (1 of 2 - 13+ 2-dose series) 08/29/2001    Cervical cancer screen  10/24/2020 (Originally 11/18/2017)    TSH testing  04/06/2020    DTaP/Tdap/Td vaccine (2 - Td) 07/19/2022    Flu vaccine  Completed    HIV screen  Completed    Pneumococcal 0-64 years Vaccine  Aged Out

## 2019-05-01 NOTE — PATIENT INSTRUCTIONS
Salonpas GEL, camphor- menthol- methyl salicylate use it as needed for pain, every 6- 8 hrs        Patient Education        Low Back Pain: Exercises  Your Care Instructions  Here are some examples of typical rehabilitation exercises for your condition. Start each exercise slowly. Ease off the exercise if you start to have pain. Your doctor or physical therapist will tell you when you can start these exercises and which ones will work best for you. How to do the exercises  Press-up    1. Lie on your stomach, supporting your body with your forearms. 2. Press your elbows down into the floor to raise your upper back. As you do this, relax your stomach muscles and allow your back to arch without using your back muscles. As your press up, do not let your hips or pelvis come off the floor. 3. Hold for 15 to 30 seconds, then relax. 4. Repeat 2 to 4 times. Alternate arm and leg (bird dog) exercise    1. Start on the floor, on your hands and knees. 2. Tighten your belly muscles. 3. Raise one leg off the floor, and hold it straight out behind you. Be careful not to let your hip drop down, because that will twist your trunk. 4. Hold for about 6 seconds, then lower your leg and switch to the other leg. 5. Repeat 8 to 12 times on each leg. 6. Over time, work up to holding for 10 to 30 seconds each time. 7. If you feel stable and secure with your leg raised, try raising the opposite arm straight out in front of you at the same time. Knee-to-chest exercise    1. Lie on your back with your knees bent and your feet flat on the floor. 2. Bring one knee to your chest, keeping the other foot flat on the floor (or keeping the other leg straight, whichever feels better on your lower back). 3. Keep your lower back pressed to the floor. Hold for at least 15 to 30 seconds. 4. Relax, and lower the knee to the starting position. 5. Repeat with the other leg. Repeat 2 to 4 times with each leg.   6. To get more stretch, put floor and the other heel touching the wall. 4. Repeat with your other leg. 5. Do 2 to 4 times for each leg. Hip flexor stretch    1. Kneel on the floor with one knee bent and one leg behind you. Place your forward knee over your foot. Keep your other knee touching the floor. 2. Slowly push your hips forward until you feel a stretch in the upper thigh of your rear leg. 3. Hold the stretch for at least 15 to 30 seconds. Repeat with your other leg. 4. Do 2 to 4 times on each side. Wall sit    1. Stand with your back 10 to 12 inches away from a wall. 2. Lean into the wall until your back is flat against it. 3. Slowly slide down until your knees are slightly bent, pressing your lower back into the wall. 4. Hold for about 6 seconds, then slide back up the wall. 5. Repeat 8 to 12 times. Follow-up care is a key part of your treatment and safety. Be sure to make and go to all appointments, and call your doctor if you are having problems. It's also a good idea to know your test results and keep a list of the medicines you take. Where can you learn more? Go to https://[email or web address].MyDatingTree. org and sign in to your [employer] account. Enter K605 in the [facility] box to learn more about \"Low Back Pain: Exercises. \"     If you do not have an account, please click on the \"Sign Up Now\" link. Current as of: September 20, 2018  Content Version: 11.9  © 0283-1598 [employer]. Care instructions adapted under license by [facility] ([facility]). If you have questions about a medical condition or this instruction, always ask your healthcare professional. [place] [address] any warranty or liability for your use of this information.

## 2019-05-01 NOTE — PROGRESS NOTES
Chief Complaint   Patient presents with    Back Pain     started last thursday. No known injury    Flank Pain         Patient presents today for an acute visit secondary to Back Pain (started last thursday. No known injury) and Flank Pain   . HPI    Patient complains of sudden onset of back pain in the lower back , bilateral , worse on the right side and radiating across and to the lateral side of the right hip, but not down the thighs or legs. Right lower back pain worse is with movement, laying on her back and sitting  Intensity of pain is 5/10  Pain interferes with sleep and activities. She says that the day before she was working out , but didn't have any pain when exercising. Denies any injury  Patient denies numbness or tingling or pain in the legs. Alexy Crowe reports she had Nausea yesterday and today  She also admits to right upper quadrant pain and right lower quadrant pain. Her period is to start today or tomorrow and she usually gets pain in the right lower quadrant before her menstrual periods  She does have history of gallstones. Urine is normal , no signs of UTI    Results for POC orders placed in visit on 05/01/19   POCT Urinalysis No Micro (Auto)   Result Value Ref Range    Color, UA light yellow     Clarity, UA clear     Glucose, UA POC negative     Bilirubin, UA negative     Ketones, UA negative     Spec Grav, UA 1.020     Blood, UA POC negative     pH, UA 7.5     Protein, UA POC negative     Urobilinogen, UA 0.2     Leukocytes, UA negative     Nitrite, UA negative        Has gained 2 lbs since the last visit. Wt Readings from Last 3 Encounters:   05/01/19 109 lb 3.2 oz (49.5 kg)   04/11/19 107 lb 9.6 oz (48.8 kg)   10/12/18 104 lb 3.2 oz (47.3 kg)     Negative depression screening.    PHQ Scores 4/11/2019 4/12/2018 10/13/2016   PHQ2 Score 0 0 0   PHQ9 Score 0 0 0     Interpretation of Total Score Depression Severity: 1-4 = Minimal depression, 5-9 = Mild depression, 10-14 = Moderate depression, 15-19 = Moderately severe depression, 20-27 = Severe depression      Allergies   Allergen Reactions    Peanut (Diagnostic)     Bactroban [Mupirocin Calcium] Rash    Sulfamethoxazole-Trimethoprim Rash         Current Outpatient Medications   Medication Sig Dispense Refill    levothyroxine (SYNTHROID) 88 MCG tablet Take 1 tablet by mouth Daily 90 tablet 2    triamcinolone (KENALOG) 0.025 % cream Apply topically every 4 hours as needed Apply Topically      ranitidine (ZANTAC) 150 MG tablet Take 1 tablet by mouth 2 times daily 60 tablet 1     No current facility-administered medications for this visit. Counseling given: Yes                    The patient's past medical, surgical, social, and family history as well as her current medications and allergies were reviewed as documented in today's encounter. Rest of complaints with corresponding details per ROS. Review of Systems   Constitutional: Negative for activity change, appetite change, chills, diaphoresis, fatigue, fever and unexpected weight change. Gastrointestinal: Positive for abdominal pain (RUQ, RLQ) and nausea. Negative for abdominal distention, constipation, diarrhea and vomiting. Genitourinary: Positive for menstrual problem (she is premenstrual, has dysmenorrhea). Negative for difficulty urinating, flank pain, frequency and urgency. Musculoskeletal: Positive for back pain. Psychiatric/Behavioral: Positive for sleep disturbance.         -vital signs stable and within normal limits   /84   Pulse 91   Ht 5' (1.524 m)   Wt 109 lb 3.2 oz (49.5 kg)   LMP 04/02/2019 (Exact Date)   SpO2 98%   BMI 21.33 kg/m²        Physical Exam   Constitutional: She is oriented to person, place, and time. She appears well-developed and well-nourished. No distress. HENT:   Head: Normocephalic and atraumatic. Eyes: Conjunctivae and EOM are normal. Right eye exhibits no discharge. Left eye exhibits no discharge.  No work    Orders Placed This Encounter   Procedures    US Liver     Standing Status:   Future     Standing Expiration Date:   4/30/2020     Order Specific Question:   Reason for exam:     Answer:   h/o gallstones, evaluate liver and gallbladder    Hepatic Function Panel     Standing Status:   Future     Standing Expiration Date:   5/1/2020    POCT Urinalysis No Micro (Auto)       Orders Placed This Encounter   Medications    baclofen (LIORESAL) 10 MG tablet     Sig: Take 1 tablet by mouth 3 times daily as needed (MUSCLE SPASMS) Causes sedation, do not drive while taking this medication     Dispense:  90 tablet     Refill:  0       There are no discontinued medications. Emilie received counseling on the following healthy behaviors: nutrition, exercise and medication adherence  Reviewed prior labs and health maintenance. Continue current medications, diet and exercise. Discussed use, benefit, and side effects of prescribed medications. Barriers to medication compliance addressed. Patient given educational materials - see patient instructions. All patient questions answered. Patient voiced understanding. Thepatient's past medical, surgical, social, and family history as well as her   current medications and allergies were reviewed as documented in today's encounter. Medications, labs, diagnostic studies,consultations and follow-up as documented in this encounter. Return for KEEP APPT. Patient was seen with total face to face time of  15 minutes. More than 50% of this visit was counseling and education. Future Appointments   Date Time Provider Andrzej Lewis   10/11/2019  7:30 AM Berenice Diehl MD [facility]     This note was completed by using the assistance of a speech-recognition program. However, inadvertent computerized transcription errors may be present.  Although every effort was made to ensure accuracy, no guarantees can be provided that every mistake has been identified and corrected by editing.     Electronically signed by Beau Rocha MD on 5/1/2019 at 10:48 PM

## 2019-05-02 LAB
A/G RATIO: ABNORMAL
ALBUMIN SERPL-MCNC: 4.4 G/DL
ALP BLD-CCNC: 37 U/L
ALT SERPL-CCNC: 9 U/L
AST SERPL-CCNC: 16 U/L
BILIRUB SERPL-MCNC: 0.7 MG/DL (ref 0.1–1.4)
BILIRUBIN DIRECT: 0.1 MG/DL
BILIRUBIN, INDIRECT: 0.7
GLOBULIN: ABNORMAL
PROTEIN TOTAL: 7.1 G/DL

## 2019-05-03 DIAGNOSIS — R10.11 PAIN, ABDOMINAL, RUQ: ICD-10-CM

## 2019-05-06 DIAGNOSIS — R10.11 PAIN, ABDOMINAL, RUQ: ICD-10-CM

## 2019-05-14 ENCOUNTER — OFFICE VISIT (OUTPATIENT)
Dept: FAMILY MEDICINE CLINIC | Age: 29
End: 2019-05-14
Payer: COMMERCIAL

## 2019-05-14 VITALS
OXYGEN SATURATION: 99 % | SYSTOLIC BLOOD PRESSURE: 110 MMHG | BODY MASS INDEX: 21.05 KG/M2 | HEART RATE: 98 BPM | HEIGHT: 60 IN | DIASTOLIC BLOOD PRESSURE: 82 MMHG | WEIGHT: 107.2 LBS | TEMPERATURE: 97.8 F

## 2019-05-14 DIAGNOSIS — R10.31 ABDOMINAL PAIN, RLQ: Primary | ICD-10-CM

## 2019-05-14 PROCEDURE — 99213 OFFICE O/P EST LOW 20 MIN: CPT | Performed by: FAMILY MEDICINE

## 2019-05-14 ASSESSMENT — ENCOUNTER SYMPTOMS
SHORTNESS OF BREATH: 0
ABDOMINAL PAIN: 1
NAUSEA: 0
SORE THROAT: 0

## 2019-05-14 NOTE — PROGRESS NOTES
Subjective:      Patient ID: Michelle Ludwig is a 25 y.o. female. Visit Information    Have you changed or started any medications since your last visit including any over-the-counter medicines, vitamins, or herbal medicines? no   Are you having any side effects from any of your medications? -  no  Have you stopped taking any of your medications? Is so, why? -  no    Have you seen any other physician or provider since your last visit? No  Have you had any other diagnostic tests since your last visit? No  Have you been seen in the emergency room and/or had an admission to a hospital since we last saw you? No  Have you had your routine dental cleaning in the past 6 months? yes -     Have you activated your [employer] account? If not, what are your barriers? Yes     Patient Care Team:  Yazan Vann MD as PCP - General (Family Medicine)  Jessica Saldana MD as Surgeon (Cardiology)        Health Maintenance   Topic Date Due    Varicella Vaccine (1 of 2 - 13+ 2-dose series) 08/29/2001    Cervical cancer screen  10/24/2020 (Originally 11/18/2017)    TSH testing  04/06/2020    DTaP/Tdap/Td vaccine (2 - Td) 07/19/2022    Flu vaccine  Completed    HIV screen  Completed    Pneumococcal 0-64 years Vaccine  Aged Out       HPI  Is 42-year-old female is seen in the office today complaining of abdominal pain mostly in her right lower quadrant she has had it for 3 weeks and it radiates to her right flank   she has no urinary symptoms has had gallbladder ultrasound  which was negative and she is concerned about the pain no nausea no vomitingArea patient also took some muscle relaxant and she stated there was little improvement   Review of Systems   Constitutional: Negative for appetite change. HENT: Negative for ear pain and sore throat. Eyes: Negative for visual disturbance. Respiratory: Negative for shortness of breath. Cardiovascular: Negative for chest pain. Gastrointestinal: Positive for abdominal pain. Negative for nausea. Genitourinary: Negative for dysuria, frequency, pelvic pain and vaginal discharge. Musculoskeletal: Negative for arthralgias. Neurological: Negative for dizziness and headaches. Objective:   Physical Exam   Constitutional: She is oriented to person, place, and time. She appears well-developed and well-nourished. /82 (Site: Left Upper Arm, Position: Sitting, Cuff Size: Large Adult)   Pulse 98   Temp 97.8 °F (36.6 °C) (Oral)   Ht 5' (1.524 m)   Wt 107 lb 3.2 oz (48.6 kg)   LMP 05/02/2019 (Approximate)   SpO2 99%   BMI 20.94 kg/m²    HENT:   Head: Normocephalic. Mouth/Throat: Oropharynx is clear and moist.        Eyes: Conjunctivae are normal.   Cardiovascular: Normal rate and regular rhythm. Pulmonary/Chest: Breath sounds normal. She has no rales. Abdominal: Soft. Bowel sounds are normal. There is tenderness. mild tenderness in the right lower quadrant present   Musculoskeletal: She exhibits tenderness. She exhibits no edema. Tenderness in the right paravertebral muscles present   Lymphadenopathy:     She has no cervical adenopathy. Neurological: She is alert and oriented to person, place, and time. Skin: No rash noted. Nursing note and vitals reviewed. Assessment:      .   Diagnosis Orders   1. Abdominal pain, RLQ  CT ABDOMEN PELVIS W WO CONTRAST Additional Contrast? Radiologist Recommendation          Plan:        Orders Placed This Encounter   Procedures    CT ABDOMEN PELVIS W WO CONTRAST Additional Contrast? Radiologist Recommendation     Standing Status:   Future     Standing Expiration Date:   5/14/2020     Order Specific Question:   Additional Contrast?     Answer:   Radiologist Recommendation     Order Specific Question:   Reason for exam:     Answer:   abdominal pain     No orders of the defined types were placed in this encounter. Return in about 1 week (around 5/21/2019) for abdominal pain.   With Dr. Cathy Gordillo current medications reviewed from the chart          Vinod Angeles MA

## 2019-05-23 ENCOUNTER — PATIENT MESSAGE (OUTPATIENT)
Dept: FAMILY MEDICINE CLINIC | Age: 29
End: 2019-05-23

## 2019-05-23 DIAGNOSIS — N91.2 AMENORRHEA: Primary | ICD-10-CM

## 2019-05-23 NOTE — TELEPHONE ENCOUNTER
From: Sp Benson  To: Daljit Almodovar MD  Sent: 5/23/2019 11:59 AM EDT  Subject: Non-Urgent Medical Question    I have a CT scan scheduled for May 29 (Wed.) at [facility]. When scheduling, they said I need a pregnancy test from my PCP office within 3 days of the scan. Do I need to schedule this? Or can I come in on Tuesday? Or can an order be faxed to a lab?

## 2019-05-29 DIAGNOSIS — R10.31 ABDOMINAL PAIN, RIGHT LOWER QUADRANT: Primary | ICD-10-CM

## 2019-05-29 DIAGNOSIS — R10.31 ABDOMINAL PAIN, RIGHT LOWER QUADRANT: ICD-10-CM

## 2019-05-30 DIAGNOSIS — N83.209 HEMORRHAGIC OVARIAN CYST: Primary | ICD-10-CM

## 2019-06-19 ENCOUNTER — OFFICE VISIT (OUTPATIENT)
Dept: OBGYN CLINIC | Age: 29
End: 2019-06-19
Payer: COMMERCIAL

## 2019-06-19 VITALS
HEIGHT: 60 IN | DIASTOLIC BLOOD PRESSURE: 91 MMHG | SYSTOLIC BLOOD PRESSURE: 130 MMHG | WEIGHT: 107 LBS | BODY MASS INDEX: 21.01 KG/M2 | HEART RATE: 100 BPM

## 2019-06-19 DIAGNOSIS — Z76.89 ENCOUNTER TO ESTABLISH CARE: Primary | ICD-10-CM

## 2019-06-19 DIAGNOSIS — N83.201 RIGHT OVARIAN CYST: ICD-10-CM

## 2019-06-19 PROCEDURE — 99203 OFFICE O/P NEW LOW 30 MIN: CPT | Performed by: OBSTETRICS & GYNECOLOGY

## 2019-06-19 NOTE — PROGRESS NOTES
History: denies    Permanent Sterilization: no  Reversible Birth Control: no  Hormone Replacement Exposure: no    OBSTETRICAL HISTORY:  OB History    Para Term  AB Living   0 0 0 0 0 0   SAB TAB Ectopic Molar Multiple Live Births   0 0 0 0 0 0       PAST MEDICAL HISTORY:   has a past medical history of GERD (gastroesophageal reflux disease), Hypothyroidism, RUQ abdominal pain, and Toxic multinodul goiter. PAST SURGICAL HISTORY:   has a past surgical history that includes Tonsillectomy (); Urethroplasty (); and Cosmetic surgery ([id number]). ALLERGIES:  is allergic to peanut (diagnostic); bactroban [mupirocin calcium]; and sulfamethoxazole-trimethoprim. MEDICATIONS:  Prior to Admission medications    Medication Sig Start Date End Date Taking? Authorizing Provider   levothyroxine (SYNTHROID) 88 MCG tablet Take 1 tablet by mouth Daily 10/12/18  Yes Anna Lennon MD   baclofen (LIORESAL) 10 MG tablet Take 1 tablet by mouth 3 times daily as needed (MUSCLE SPASMS) Causes sedation, do not drive while taking this medication 19   Anna Lennon MD   ranitidine (ZANTAC) 150 MG tablet Take 1 tablet by mouth 2 times daily 16   Anna Lennon MD       FAMILY HISTORY:  Family History of Breast, Ovarian, Colon or Uterine Cancer: No   family history includes Cancer in her maternal grandfather and paternal grandmother; Celiac Disease in her father; Diabetes (age of onset: 25) in her brother; Heart Disease in her maternal grandmother; Other in her paternal grandmother. SOCIAL HISTORY:   reports that she has never smoked. She has never used smokeless tobacco. She reports that she drinks about 1.2 oz of alcohol per week. She reports that she does not use drugs.     HEALTH MAINTENANCE:  Immunization status: stated as up to date, no records available   Date of Last Mammogram: n/a  Date of Last Colonoscopy: n/a  Date of Last Bone Density: n/a    VITALS:  Vitals:    19 1022 or free air. Bowel: No bowel dilation or wall thickening.  The appendix is not definitively characterize.  No evidence of pericecal inflammation. Pelvis: Markedly distended urinary bladder. Small amount of free fluid in the right adnexa surrounding a crenated ovarian cyst presumably recently ruptured.  There is also fluid or blood products in the endometrium likely physiologic    Abdominal wall: Unremarkable. .    Bones: No acute abnormalities. .    IMPRESSION:      1.  The appendix is not seen however there is no indication of pericecal inflammation. 2.  Suspected ruptured right ovarian cyst with small amount of adjacent fluid. 3.  Prominent endometrium.  Potentially physiologic.  Correlate with symptoms. 4.  Markedly distended urinary bladder. All CT scans at this facility use dose modulation, iterative reconstruction, and/or weight based dosing when appropriate to reduce radiation dose to as low as reasonably achievable. Approved by Resident: Gaby Blackburn MD  on 5/29/2019 1:43 PM    I, Sussy Robles, DO have personally reviewed the image(s) and agree with and/or edited the report    Workstation:[id number]         ASSESSMENT & PLAN:    Radha Raphael is a 25 y.o. female [facility]    - reviewed that ovarian cysts are a normal physiologic occurence and that CT scan was not suspicious. Discussed that if symptoms persist, a pelvic ultrasound would be recommended. - reviewed medical management options for dysmenorrhea and that OCPs would treat both her dysmenorrhea as well as ovarian cysts. Patient declines any management at this time   - reviewed ASCCP recommendations   - patient vocalized understanding and desires to continue to follow with her PCP.  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

## 2019-07-29 DIAGNOSIS — E03.9 ACQUIRED HYPOTHYROIDISM: ICD-10-CM

## 2019-07-29 RX ORDER — LEVOTHYROXINE SODIUM 88 UG/1
88 TABLET ORAL DAILY
Qty: 90 TABLET | Refills: 2 | Status: SHIPPED | OUTPATIENT
Start: 2019-07-29 | End: 2019-10-11 | Stop reason: SDUPTHER

## 2019-10-07 LAB
ALBUMIN SERPL-MCNC: 4.2 G/DL
ALP BLD-CCNC: 39 U/L
ALT SERPL-CCNC: 9 U/L
ANION GAP SERPL CALCULATED.3IONS-SCNC: 12 MMOL/L
AST SERPL-CCNC: 18 U/L
BASOPHILS ABSOLUTE: NORMAL /ΜL
BASOPHILS RELATIVE PERCENT: NORMAL %
BILIRUB SERPL-MCNC: 0.8 MG/DL (ref 0.1–1.4)
BUN BLDV-MCNC: 10 MG/DL
CALCIUM SERPL-MCNC: 9.3 MG/DL
CHLORIDE BLD-SCNC: 106 MMOL/L
CO2: 24 MMOL/L
CREAT SERPL-MCNC: 0.74 MG/DL
EOSINOPHILS ABSOLUTE: NORMAL /ΜL
EOSINOPHILS RELATIVE PERCENT: NORMAL %
GFR CALCULATED: >60
GLUCOSE BLD-MCNC: 79 MG/DL
HCT VFR BLD CALC: 41.2 % (ref 36–46)
HEMOGLOBIN: 13.9 G/DL (ref 12–16)
LYMPHOCYTES ABSOLUTE: NORMAL /ΜL
LYMPHOCYTES RELATIVE PERCENT: NORMAL %
MCH RBC QN AUTO: 29.4 PG
MCHC RBC AUTO-ENTMCNC: 33.7 G/DL
MCV RBC AUTO: 87 FL
MONOCYTES ABSOLUTE: NORMAL /ΜL
MONOCYTES RELATIVE PERCENT: NORMAL %
NEUTROPHILS ABSOLUTE: NORMAL /ΜL
NEUTROPHILS RELATIVE PERCENT: NORMAL %
PLATELET # BLD: 206 K/ΜL
PMV BLD AUTO: 8.8 FL
POTASSIUM SERPL-SCNC: 3.7 MMOL/L
RBC # BLD: 4.73 10^6/ΜL
SODIUM BLD-SCNC: 142 MMOL/L
TOTAL PROTEIN: 6.9
TSH SERPL DL<=0.05 MIU/L-ACNC: 5.16 UIU/ML
VITAMIN D 25-HYDROXY: 38.8
VITAMIN D2, 25 HYDROXY: NORMAL
VITAMIN D3,25 HYDROXY: NORMAL
WBC # BLD: 4.9 10^3/ML

## 2019-10-08 DIAGNOSIS — Z01.84 IMMUNITY STATUS TESTING: ICD-10-CM

## 2019-10-08 DIAGNOSIS — R53.82 CHRONIC FATIGUE: ICD-10-CM

## 2019-10-08 DIAGNOSIS — E03.9 ACQUIRED HYPOTHYROIDISM: ICD-10-CM

## 2019-10-11 ENCOUNTER — OFFICE VISIT (OUTPATIENT)
Dept: FAMILY MEDICINE CLINIC | Age: 29
End: 2019-10-11
Payer: COMMERCIAL

## 2019-10-11 VITALS
DIASTOLIC BLOOD PRESSURE: 80 MMHG | WEIGHT: 107 LBS | TEMPERATURE: 97.5 F | OXYGEN SATURATION: 98 % | SYSTOLIC BLOOD PRESSURE: 100 MMHG | BODY MASS INDEX: 21.01 KG/M2 | HEIGHT: 60 IN | HEART RATE: 100 BPM

## 2019-10-11 DIAGNOSIS — K80.20 CALCULUS OF GALLBLADDER WITHOUT CHOLECYSTITIS WITHOUT OBSTRUCTION: ICD-10-CM

## 2019-10-11 DIAGNOSIS — E03.9 ACQUIRED HYPOTHYROIDISM: Primary | ICD-10-CM

## 2019-10-11 DIAGNOSIS — Z23 NEED FOR IMMUNIZATION AGAINST INFLUENZA: ICD-10-CM

## 2019-10-11 PROCEDURE — 99213 OFFICE O/P EST LOW 20 MIN: CPT | Performed by: FAMILY MEDICINE

## 2019-10-11 PROCEDURE — 90686 IIV4 VACC NO PRSV 0.5 ML IM: CPT | Performed by: FAMILY MEDICINE

## 2019-10-11 PROCEDURE — 90471 IMMUNIZATION ADMIN: CPT | Performed by: FAMILY MEDICINE

## 2019-10-11 RX ORDER — LEVOTHYROXINE SODIUM 0.03 MG/1
25 TABLET ORAL
Qty: 12 TABLET | Refills: 0 | Status: SHIPPED | OUTPATIENT
Start: 2019-10-11 | End: 2020-01-02 | Stop reason: SDUPTHER

## 2019-10-11 RX ORDER — LEVOTHYROXINE SODIUM 88 UG/1
88 TABLET ORAL DAILY
Qty: 90 TABLET | Refills: 2 | Status: SHIPPED | OUTPATIENT
Start: 2019-10-11 | End: 2020-05-02 | Stop reason: SDUPTHER

## 2019-10-11 ASSESSMENT — ENCOUNTER SYMPTOMS
DIARRHEA: 0
SHORTNESS OF BREATH: 0
ABDOMINAL PAIN: 0
ABDOMINAL DISTENTION: 0
NAUSEA: 0
COUGH: 0
CHEST TIGHTNESS: 0
CONSTIPATION: 0
WHEEZING: 0

## 2019-11-09 LAB
T4 FREE: 1.11
TSH SERPL DL<=0.05 MIU/L-ACNC: 3.43 UIU/ML

## 2019-11-11 DIAGNOSIS — E03.9 ACQUIRED HYPOTHYROIDISM: ICD-10-CM

## 2020-01-02 RX ORDER — LEVOTHYROXINE SODIUM 0.03 MG/1
25 TABLET ORAL
Qty: 12 TABLET | Refills: 3 | Status: SHIPPED | OUTPATIENT
Start: 2020-01-02 | End: 2021-01-04

## 2020-04-14 ENCOUNTER — TELEMEDICINE (OUTPATIENT)
Dept: FAMILY MEDICINE CLINIC | Age: 30
End: 2020-04-14
Payer: COMMERCIAL

## 2020-04-14 VITALS — WEIGHT: 107 LBS | BODY MASS INDEX: 21.01 KG/M2 | HEIGHT: 60 IN

## 2020-04-14 PROCEDURE — 99213 OFFICE O/P EST LOW 20 MIN: CPT | Performed by: FAMILY MEDICINE

## 2020-04-14 ASSESSMENT — PATIENT HEALTH QUESTIONNAIRE - PHQ9
SUM OF ALL RESPONSES TO PHQ9 QUESTIONS 1 & 2: 0
2. FEELING DOWN, DEPRESSED OR HOPELESS: 0
SUM OF ALL RESPONSES TO PHQ QUESTIONS 1-9: 0
SUM OF ALL RESPONSES TO PHQ QUESTIONS 1-9: 0
1. LITTLE INTEREST OR PLEASURE IN DOING THINGS: 0

## 2020-04-14 ASSESSMENT — ENCOUNTER SYMPTOMS
NAUSEA: 0
CONSTIPATION: 0
ABDOMINAL DISTENTION: 0
COUGH: 0
ABDOMINAL PAIN: 0
CHEST TIGHTNESS: 0
SHORTNESS OF BREATH: 0
WHEEZING: 0
DIARRHEA: 0

## 2020-04-14 NOTE — PATIENT INSTRUCTIONS
serving is 1 slice of bread, 1 ounce of dry cereal, or ½ cup of cooked rice, pasta, or cooked cereal. Try to choose whole-grain products as much as possible. · Limit lean meat, poultry, and fish to 2 servings each day. A serving is 3 ounces, about the size of a deck of cards. · Eat 4 to 5 servings of nuts, seeds, and legumes (cooked dried beans, lentils, and split peas) each week. A serving is 1/3 cup of nuts, 2 tablespoons of seeds, or ½ cup of cooked beans or peas. · Limit fats and oils to 2 to 3 servings each day. A serving is 1 teaspoon of vegetable oil or 2 tablespoons of salad dressing. · Limit sweets and added sugars to 5 servings or less a week. A serving is 1 tablespoon jelly or jam, ½ cup sorbet, or 1 cup of lemonade. · Eat less than 2,300 milligrams (mg) of sodium a day. If you limit your sodium to 1,500 mg a day, you can lower your blood pressure even more. Tips for success  · Start small. Do not try to make dramatic changes to your diet all at once. You might feel that you are missing out on your favorite foods and then be more likely to not follow the plan. Make small changes, and stick with them. Once those changes become habit, add a few more changes. · Try some of the following:  ? Make it a goal to eat a fruit or vegetable at every meal and at snacks. This will make it easy to get the recommended amount of fruits and vegetables each day. ? Try yogurt topped with fruit and nuts for a snack or healthy dessert. ? Add lettuce, tomato, cucumber, and onion to sandwiches. ? Combine a ready-made pizza crust with low-fat mozzarella cheese and lots of vegetable toppings. Try using tomatoes, squash, spinach, broccoli, carrots, cauliflower, and onions. ? Have a variety of cut-up vegetables with a low-fat dip as an appetizer instead of chips and dip. ? Sprinkle sunflower seeds or chopped almonds over salads. Or try adding chopped walnuts or almonds to cooked vegetables.   ? Try some vegetarian meals using beans and peas. Add garbanzo or kidney beans to salads. Make burritos and tacos with mashed fernandez beans or black beans. Where can you learn more? Go to [email or web address] org and sign in to your MessageParty account. Enter R011 in the [employer] box to learn more about \"DASH Diet: Care Instructions. \"     If you do not have an account, please click on the \"Sign Up Now\" link. Current as of: December 15, 2019Content Version: 12.4  © 0169-3500 Healthwise, Incorporated. Care instructions adapted under license by [facility] ([facility]). If you have questions about a medical condition or this instruction, always ask your healthcare professional. [address] any warranty or liability for your use of this information.

## 2020-04-14 NOTE — PROGRESS NOTES
2020    TELEHEALTH EVALUATION -- Audio/Visual (During RVQCR-15 public health emergency)    HPI:    Demi Armas (:  1990) has requested an audio/video evaluation for the following concern(s):Thyroid Problem          Hypothyroidism: Recent symptoms: none. She denies fatigue, weight gain, weight loss, cold intolerance, heat intolerance, hair loss, dry skin, constipation, diarrhea, edema, anxiety, tremor, palpitations and dysphagia. Patient is  taking her medication consistently on an empty stomach. TSH is Normal.    No results found for: TSHREFLEX  Lab Results   Component Value Date    TSH 3.43 2019    TSH 5.16 10/07/2019    TSH 4.38 2019       Her weight is stable  Wt Readings from Last 3 Encounters:   20 107 lb (48.5 kg)   10/11/19 107 lb (48.5 kg)   19 107 lb (48.5 kg)       Negative depression screening    Patient has lost her job due to the coronavirus pandemic   Patient says she is dealing with the stress okay, she started to exercise recently which helps. Patient says she sleeps well    PHQ-2 Over the past 2 weeks, how often have you been bothered by any of the following problems? Little interest or pleasure in doing things: Not at all  Feeling down, depressed, or hopeless: Not at all  PHQ-2 Score: 0  PHQ-9 Over the past 2 weeks, how often have you been bothered by any of the following problems? PHQ-9 Total Score: 0      PHQ Scores 2020 2019 2018 10/13/2016   PHQ2 Score 0 0 0 0   PHQ9 Score 0 0 0 0     Review of Systems   Constitutional: Negative for activity change, appetite change, chills, diaphoresis, fatigue, fever and unexpected weight change. Respiratory: Negative for cough, chest tightness, shortness of breath and wheezing. Cardiovascular: Negative for chest pain, palpitations and leg swelling. Gastrointestinal: Negative for abdominal distention, abdominal pain, constipation, diarrhea and nausea.    Endocrine: Negative for cold Lab Results   Component Value Date    [id number] 426 07/16/2016       Lab Results   Component Value Date    FOLATE 17.2 07/16/2016       No results found for: IRON, TIBC, FERRITIN    Lab Results   Component Value Date    VITD25 38.8 10/07/2019       ASSESSMENT/PLAN:    1. Acquired hypothyroidism  Stable  Continue current treatment Synthroid 88 MCG daily and additional 25 MCG over the weekends    - TSH without Reflex; Future in 6 months-    Advised to take Synthroid in the morning, on empty stomach, without any other meds and with a full glass of water. Emilie received counseling on the following healthy behaviors: nutrition, exercise and medication adherence  Reviewed prior labs and health maintenance. Continue current medications, diet and exercise. Discussed use, benefit, and side effects of prescribed medications. Barriers to medication compliance addressed. Patient given educational materials - see patient instructions. All patient questions answered. Patient voiced understanding. Return in about 6 months (around 10/14/2020) for 30mins ANNUAL/PHYSICAL, VISION screen, PHQ9., LABS F/U. Data Unavailable      Future Appointments   Date Time Provider Andrzej Lewis   10/14/2020  8:00 AM Iqra Raya MD [facility]              An  electronic signature was used to authenticate this note. --Iqra Raya MD on 4/14/2020 at 8:15 AM      Pursuant to the emergency declaration under the [facility], On license of [facility] waiver authority and the [employer] and [employer] Act, this Virtual  Visit was conducted, with patient's consent, to reduce the patient's risk of exposure to COVID-19 and provide continuity of care for an established patient. Services were provided through a video synchronous discussion virtually to substitute for in-person clinic visit.      Addendum made on 4/15/2020 at 7:05 PM    Emilie Biggs is a 30 y.o. female being evaluated by a Virtual Visit (video visit) encounter to address concerns as mentioned above. Due to this being a TeleHealth encounter (During [facility] public [facility]), evaluation of the following organ systems was limited: Vitals/Constitutional/EENT/Resp/CV/GI//MS/Neuro/Skin/Heme-Lymph-Imm. Pursuant to the emergency declaration under the [address] [place] [address] and the [employer] and [employer] Act, this Virtual Visit was conducted with patient's (and/or legal guardian's) consent, to reduce the patient's risk of exposure to COVID-19 and provide necessary medical care. The patient (and/or legal guardian) has also been advised to contact this office for worsening conditions or problems, and seek emergency medical treatment and/or call 911 if deemed necessary. Services were provided through a video synchronous discussion virtually to substitute for in-person clinic visit. Patient location's was at home, and provider's location was in the office. --Garrett Quiñones MD on 4/15/2020 at 7:05 PM    An electronic signature was used to authenticate this note.   Electronically signed by Garrett Quiñones MD on 4/15/2020 at 7:05 PM

## 2020-05-04 RX ORDER — LEVOTHYROXINE SODIUM 88 UG/1
88 TABLET ORAL DAILY
Qty: 90 TABLET | Refills: 3 | Status: SHIPPED | OUTPATIENT
Start: 2020-05-04 | End: 2021-05-10 | Stop reason: SDUPTHER

## 2020-05-04 NOTE — TELEPHONE ENCOUNTER
Please Approve or Refuse.   Send to Pharmacy per Pt's Request:      Next Visit Date:  10/14/2020   Last Visit Date: 10/11/2019    Hemoglobin A1C (%)   Date Value   10/12/2018 5.2             ( goal A1C is < 7)   BP Readings from Last 3 Encounters:   10/11/19 100/80   06/19/19 (!) 130/91   05/14/19 110/82          (goal 120/80)  BUN   Date Value Ref Range Status   10/07/2019 10 mg/dL Final     CREATININE   Date Value Ref Range Status   10/07/2019 0.74  Final     Potassium   Date Value Ref Range Status   10/07/2019 3.7 mmol/L Final

## 2020-10-01 PROBLEM — Z86.79 HISTORY OF VENTRICULAR TACHYCARDIA: Status: ACTIVE | Noted: 2020-10-01

## 2020-10-07 ENCOUNTER — PATIENT MESSAGE (OUTPATIENT)
Dept: FAMILY MEDICINE CLINIC | Age: 30
End: 2020-10-07

## 2020-10-14 ENCOUNTER — OFFICE VISIT (OUTPATIENT)
Dept: FAMILY MEDICINE CLINIC | Age: 30
End: 2020-10-14
Payer: COMMERCIAL

## 2020-10-14 VITALS
HEIGHT: 60 IN | DIASTOLIC BLOOD PRESSURE: 84 MMHG | TEMPERATURE: 98.1 F | SYSTOLIC BLOOD PRESSURE: 100 MMHG | OXYGEN SATURATION: 98 % | WEIGHT: 110.4 LBS | HEART RATE: 100 BPM | BODY MASS INDEX: 21.68 KG/M2

## 2020-10-14 PROCEDURE — 99395 PREV VISIT EST AGE 18-39: CPT | Performed by: FAMILY MEDICINE

## 2020-10-14 SDOH — ECONOMIC STABILITY: FOOD INSECURITY: WITHIN THE PAST 12 MONTHS, YOU WORRIED THAT YOUR FOOD WOULD RUN OUT BEFORE YOU GOT MONEY TO BUY MORE.: NEVER TRUE

## 2020-10-14 SDOH — ECONOMIC STABILITY: TRANSPORTATION INSECURITY
IN THE PAST 12 MONTHS, HAS LACK OF TRANSPORTATION KEPT YOU FROM MEETINGS, WORK, OR FROM GETTING THINGS NEEDED FOR DAILY LIVING?: NO

## 2020-10-14 SDOH — ECONOMIC STABILITY: INCOME INSECURITY: HOW HARD IS IT FOR YOU TO PAY FOR THE VERY BASICS LIKE FOOD, HOUSING, MEDICAL CARE, AND HEATING?: NOT HARD AT ALL

## 2020-10-14 SDOH — ECONOMIC STABILITY: FOOD INSECURITY: WITHIN THE PAST 12 MONTHS, THE FOOD YOU BOUGHT JUST DIDN'T LAST AND YOU DIDN'T HAVE MONEY TO GET MORE.: NEVER TRUE

## 2020-10-14 SDOH — ECONOMIC STABILITY: TRANSPORTATION INSECURITY
IN THE PAST 12 MONTHS, HAS THE LACK OF TRANSPORTATION KEPT YOU FROM MEDICAL APPOINTMENTS OR FROM GETTING MEDICATIONS?: NO

## 2020-10-14 ASSESSMENT — PATIENT HEALTH QUESTIONNAIRE - PHQ9
1. LITTLE INTEREST OR PLEASURE IN DOING THINGS: 0
SUM OF ALL RESPONSES TO PHQ9 QUESTIONS 1 & 2: 0
2. FEELING DOWN, DEPRESSED OR HOPELESS: 0
SUM OF ALL RESPONSES TO PHQ QUESTIONS 1-9: 0
SUM OF ALL RESPONSES TO PHQ QUESTIONS 1-9: 0

## 2020-10-14 ASSESSMENT — ENCOUNTER SYMPTOMS
ABDOMINAL DISTENTION: 0
ABDOMINAL PAIN: 0
BACK PAIN: 0
NAUSEA: 0
WHEEZING: 0
COUGH: 0
VOMITING: 0
BLOOD IN STOOL: 0
CONSTIPATION: 0
DIARRHEA: 0
CHEST TIGHTNESS: 0
SHORTNESS OF BREATH: 0

## 2020-10-14 ASSESSMENT — VISUAL ACUITY
OS_CC: 20/20
OD_CC: 20/20

## 2020-10-14 NOTE — PATIENT INSTRUCTIONS
when it's cloudy, put broad-spectrum sunscreen (SPF 30 or higher) on any exposed skin. · See a dentist one or two times a year for checkups and to have your teeth cleaned. · Wear a seat belt in the car. Follow your doctor's advice about when to have certain tests. These tests can spot problems early. For everyone  · Cholesterol. Have the fat (cholesterol) in your blood tested after age 21. Your doctor will tell you how often to have this done based on your age, family history, or other things that can increase your risk for heart disease. · Blood pressure. Have your blood pressure checked during a routine doctor visit. Your doctor will tell you how often to check your blood pressure based on your age, your blood pressure results, and other factors. · Vision. Talk with your doctor about how often to have a glaucoma test.  · Diabetes. Ask your doctor whether you should have tests for diabetes. · Colon cancer. Your risk for colorectal cancer gets higher as you get older. Some experts say that adults should start regular screening at age 48 and stop at age 76. Others say to start before age 48 or continue after age 76. Talk with your doctor about your risk and when to start and stop screening. For women  · Breast exam and mammogram. Talk to your doctor about when you should have a clinical breast exam and a mammogram. Medical experts differ on whether and how often women under 50 should have these tests. Your doctor can help you decide what is right for you. · Cervical cancer screening test and pelvic exam. Begin with a Pap test at age 24. The test often is part of a pelvic exam. Starting at age 27, you may choose to have a Pap test, an HPV test, or both tests at the same time (called co-testing). Talk with your doctor about how often to have testing. · Tests for sexually transmitted infections (STIs). Ask whether you should have tests for STIs.  You may be at risk if you have sex with more than one person,

## 2020-10-14 NOTE — PROGRESS NOTES
Meningococcal MCV4P (Menactra) 04/13/2007    Tdap (Boostrix, Adacel) 04/13/2007, 07/19/2012       Tdap one time, then Td every 10 years-up to date:  Yes    Influenza annually-up to date:  Yes    PPSV 23 in all adults 19-63 yo with chronic conditions,smokers, alcoholism,  nursing home residents; then PCV 13 at 69 yo-up to date: N/A    Menopause: No   Patient's last menstrual period was 09/17/2020 (exact date). Colonoscopy recommended at 45 yo  The patient has NO family history of colon cancer    Blood pressure is normal.  BP Readings from Last 3 Encounters:   10/14/20 100/84   10/11/19 100/80   06/19/19 (!) 130/91       Pulse is high. Mild tachycardia   Saw cardiology but they didn't do EKG per report reviewed with patient   Patient has a history of ventricular tachycardia with  4 beat-run on Holter from 8/2/16    Pulse Readings from Last 3 Encounters:   10/14/20 100   10/11/19 100   06/19/19 100     She has Nonhealing wound for several years, sleeping on the back makes it bleed and scab more  Doesn't go anywhere anymore, she followed with at least 2 groups of plastic surgeons and wound care. She also had failed graft. She thinks her beautiful long hair might be pulling the margins of the wound. She does admit that she is not eating enough meat, not taking supplements     Emilie report Fatigue on and off  Didn't do the thyroid test yet. Denies fever, chills, night sweats. Denies increased appetite, thirst or polyuria.       A1c is normal   Lab Results   Component Value Date    LABA1C 5.2 10/12/2018         Lipid screening -lipid profile is within normal limits     Lab Results   Component Value Date    CHOL 155 09/24/2016     Lab Results   Component Value Date    TRIG 46 09/24/2016     Lab Results   Component Value Date    HDL 60 09/24/2016     Lab Results   Component Value Date    LDLCALC 86 09/24/2016     Lab Results   Component Value Date    CHOLHDLRATIO 2.6 09/24/2016          [x]Negative depression screening.     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     Priority: High    B12 deficiency 02/09/2016     Priority: Medium    GERD (gastroesophageal reflux disease) 04/07/2015     Priority: Medium    Murmur, cardiac 09/30/2014     Priority: Medium    Abnormal EKG 09/30/2014     Priority: Medium    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 Tiffany Lorenzo 37 abdominal pain 4/7/2015    Toxic multinodul goiter 2010    had iodine 131 therapy      Past Surgical History:   Procedure Laterality Date    COSMETIC SURGERY  2014-1015    scalp wound    TONSILLECTOMY  1998    URETHROPLASTY  1998    ureathal reimplantation     Family History   Problem Relation Age of Onset    Celiac Disease Father     Diabetes Brother 25    Heart Disease Maternal Grandmother     Cancer Maternal Grandfather     Cancer Paternal Grandmother         lung    Other Paternal Grandmother         Alzheimer    Breast Cancer Neg Hx     Colon Cancer Neg Hx     Uterine Cancer Neg Hx     Ovarian Cancer Neg Hx      Social History     Tobacco Hematological: Does not bruise/bleed easily. Psychiatric/Behavioral: Negative for dysphoric mood and sleep disturbance. The patient is not nervous/anxious.          -vital signs stable and within normal limits except mild tachycardia   /84   Pulse 100   Temp 98.1 °F (36.7 °C) (Temporal)   Ht 5' (1.524 m)   Wt 110 lb 6.4 oz (50.1 kg)   LMP 09/17/2020 (Exact Date)   SpO2 98%   BMI 21.56 kg/m²        Physical Exam  Vitals signs and nursing note reviewed. Constitutional:       General: She is not in acute distress. Appearance: Normal appearance. She is well-developed. She is not diaphoretic. HENT:      Head: Normocephalic and atraumatic. Right Ear: Hearing, tympanic membrane, ear canal and external ear normal.      Left Ear: Hearing, tympanic membrane, ear canal and external ear normal.      Nose: Nose normal. No mucosal edema. Mouth/Throat:      Pharynx: No oropharyngeal exudate. Eyes:      General: Lids are normal. No scleral icterus. Right eye: No discharge. Left eye: No discharge. Conjunctiva/sclera: Conjunctivae normal.   Neck:      Musculoskeletal: Normal range of motion and neck supple. Thyroid: No thyromegaly. Cardiovascular:      Rate and Rhythm: Normal rate and regular rhythm. Heart sounds: Normal heart sounds. No murmur. Pulmonary:      Effort: Pulmonary effort is normal. No respiratory distress. Breath sounds: Normal breath sounds. No wheezing or rales. Chest:      Chest wall: No tenderness. Abdominal:      General: Bowel sounds are normal. There is no distension. Palpations: Abdomen is soft. There is no hepatomegaly or splenomegaly. Tenderness: There is no abdominal tenderness. Musculoskeletal: Normal range of motion. General: No tenderness. Right lower leg: No edema. Left lower leg: No edema. Skin:     General: Skin is warm and dry.       Capillary Refill: Capillary refill takes less than 2 seconds. Findings: No rash. Comments: Scalp wound superficial 12 cm x 8 cm x 1 mm, extensive scabbing and yellow crusting in the middle, no active bleeding at this time, no signs of infection   Neurological:      Mental Status: She is alert and oriented to person, place, and time. Cranial Nerves: No cranial nerve deficit. Motor: No abnormal muscle tone. Gait: Gait normal.      Deep Tendon Reflexes: Reflexes normal.      Reflex Scores:       Patellar reflexes are 2+ on the right side and 2+ on the left side. Psychiatric:         Mood and Affect: Mood normal.         Behavior: Behavior normal.         Thought Content: Thought content normal.         Judgment: Judgment normal.         ASSESSMENT AND PLAN      1. Annual physical exam  Low carb, low fat diet, increase fruits and vegetables, and exercise 4-5 times a week 30-40 minutes a day, or walk 1-2 hours per day, or wear a pedometer and get at least 10,000 steps per day. Dental exam 2-3 times /year advised. Immunizations reviewed. Health Maintenance reviewed -updated labs      2. History of ventricular tachycardia  Mild tachycardia   No recent EKG done at cardiology   - EKG 12 Lead; Future  - Lipid Panel; Future    3. Open wound of scalp, unspecified open wound type, subsequent encounter  Failing to change as expected. Failed skin graft    - [facility] - Deepa Yang MD, Plastic Surgery, [place]  Increase proteins  Take collagen po  Biotin po or \"skin and nails\" supplement    4. Chronic fatigue  intermittent   - CBC; Future  - Comprehensive Metabolic Panel; Future  - Vitamin D 25 Hydroxy; Future  TSH pending    5. Screening for cardiovascular condition  - Lipid Panel;  Future          Data Unavailable    Orders Placed This Encounter   Procedures    CBC     Standing Status:   Future     Standing Expiration Date:   12/14/2020    Comprehensive Metabolic Panel     Standing Status:   Future     Standing Expiration Date: 12/14/2020    Vitamin D 25 Hydroxy     Standing Status:   Future     Standing Expiration Date:   12/14/2020    Lipid Panel     Standing Status:   Future     Standing Expiration Date:   12/14/2020     Order Specific Question:   Is Patient Fasting?/# of Hours     Answer:   8-10 Hours, water ok to drink    [facility] - Alvarez Mitchell MD, Plastic Surgery, [facility]     Referral Priority:   Routine     Referral Type:   Eval and Treat     Referral Reason:   Specialty Services Required     Referred to Provider:   Galdino Leach MD     Requested Specialty:   Plastic Surgery     Number of Visits Requested:   1    EKG 12 Lead     Standing Status:   Future     Standing Expiration Date:   12/14/2020     Order Specific Question:   Reason for Exam?     Answer: Tachycardia       There are no discontinued medications. Emilie received counseling on the following healthy behaviors: nutrition, exercise and medication adherence  Reviewed prior labs and health maintenance  Continue current medications, diet and exercise. Discussed use, benefit, and side effects of prescribed medications. Barriers to medication compliance addressed. Patient given educational materials - see patient instructions  Was a self-tracking handout given in paper form or via [employer]? No    Requested Prescriptions      No prescriptions requested or ordered in this encounter       All patient questions answered. Patient voiced understanding. Quality Measures    Body mass index is 21.56 kg/m². Normal. Weight control planned discussed Healthy diet and regular exercise. BP: 100/84 Blood pressure is normal. Treatment plan consists of No treatment change needed. The patient's past medical, surgical, social, andfamily history as well as her   current medications and allergies were reviewed as documented in today's encounter. Medications, labs, diagnostic studies, consultations and follow-up as documented in thisencounter.     Return in about 6 months (around 4/14/2021) for chronic, issues,. thyroid, PHQ-9. Patient was seen with total face to face timeof 30 minutes. More than 50% of this visit was counseling and education. Future Appointments   Date Time Provider Andrzej Lewis   4/14/2021  8:00 AM [facility]       This note was completed by using the assistance of a speech-recognition program. However, inadvertent computerized transcription errors may be present. Although every effort was made to ensure accuracy, no guarantees can be provided that every mistake has been identified and corrected by editing.     Electronically signed by Jaqui Dale MD on 10/14/2020 at 9:58 PM

## 2020-10-14 NOTE — PROGRESS NOTES
Visit Information    Have you changed or started any medications since your last visit including any over-the-counter medicines, vitamins, or herbal medicines? no   Are you having any side effects from any of your medications? -  no  Have you stopped taking any of your medications? Is so, why? -  no    Have you seen any other physician or provider since your last visit? No  Have you had any other diagnostic tests since your last visit? No  Have you been seen in the emergency room and/or had an admission to a hospital since we last saw you? No  Have you had your routine dental cleaning in the past 6 months? yes -     Have you activated your [employer] account? If not, what are your barriers?  Yes     Patient Care Team:  Anayeli García MD as PCP - General (Family Medicine)  Anayeli García MD as PCP - [facility]  Jean Pierre Villegas MD as Surgeon (Cardiology)  Pina Brown DO as Consulting Physician (Obstetrics & Gynecology)  Michael Tejada MD as Surgeon (Plastic Surgery)    Medical History Review  Past Medical, Family, and Social History reviewed and does contribute to the patient presenting condition    Health Maintenance   Topic Date Due    Cervical cancer screen  10/24/2020 (Originally 11/18/2017)    TSH testing  11/09/2020    DTaP/Tdap/Td vaccine (3 - Td) 07/19/2022    Hepatitis B vaccine  Completed    Meningococcal (ACWY) vaccine  Completed    Flu vaccine  Completed    HIV screen  Completed    Hepatitis A vaccine  Aged Out    Hib vaccine  Aged Out    Pneumococcal 0-64 years Vaccine  Aged Out    Varicella vaccine  Discontinued

## 2020-10-19 LAB
ALBUMIN SERPL-MCNC: 4.5 G/DL
ALP BLD-CCNC: 37 U/L
ALT SERPL-CCNC: 14 U/L
ANION GAP SERPL CALCULATED.3IONS-SCNC: 11 MMOL/L
AST SERPL-CCNC: 18 U/L
BASOPHILS ABSOLUTE: NORMAL
BASOPHILS RELATIVE PERCENT: NORMAL
BILIRUB SERPL-MCNC: 0.6 MG/DL (ref 0.1–1.4)
BUN BLDV-MCNC: 12 MG/DL
CALCIUM SERPL-MCNC: 9.4 MG/DL
CHLORIDE BLD-SCNC: 105 MMOL/L
CHOLESTEROL, TOTAL: 153 MG/DL
CHOLESTEROL/HDL RATIO: 2.7
CO2: 26 MMOL/L
CREAT SERPL-MCNC: 0.81 MG/DL
EOSINOPHILS ABSOLUTE: NORMAL
EOSINOPHILS RELATIVE PERCENT: NORMAL
GFR CALCULATED: >60
GLUCOSE BLD-MCNC: 75 MG/DL
HCT VFR BLD CALC: 41.6 % (ref 36–46)
HDLC SERPL-MCNC: 56 MG/DL (ref 35–70)
HEMOGLOBIN: 13.7 G/DL (ref 12–16)
LDL CHOLESTEROL CALCULATED: 88 MG/DL (ref 0–160)
LYMPHOCYTES ABSOLUTE: NORMAL
LYMPHOCYTES RELATIVE PERCENT: NORMAL
MCH RBC QN AUTO: 28.9 PG
MCHC RBC AUTO-ENTMCNC: 33 G/DL
MCV RBC AUTO: 88 FL
MONOCYTES ABSOLUTE: NORMAL
MONOCYTES RELATIVE PERCENT: NORMAL
NEUTROPHILS ABSOLUTE: NORMAL
NEUTROPHILS RELATIVE PERCENT: NORMAL
NONHDLC SERPL-MCNC: NORMAL MG/DL
PLATELET # BLD: 218 K/ΜL
PMV BLD AUTO: 8.8 FL
POTASSIUM SERPL-SCNC: 4.3 MMOL/L
RBC # BLD: 4.75 10^6/ΜL
SODIUM BLD-SCNC: 142 MMOL/L
T4 FREE: 1.35
TOTAL PROTEIN: 7.2
TRIGL SERPL-MCNC: 46 MG/DL
TSH SERPL DL<=0.05 MIU/L-ACNC: 2.24 UIU/ML
VITAMIN D 25-HYDROXY: 48.6
VITAMIN D2, 25 HYDROXY: NORMAL
VITAMIN D3,25 HYDROXY: NORMAL
VLDLC SERPL CALC-MCNC: 9 MG/DL
WBC # BLD: 5.1 10^3/ML

## 2020-10-20 PROBLEM — D48.5 NEOPLASM OF UNCERTAIN BEHAVIOR OF SKIN: Status: ACTIVE | Noted: 2020-10-20

## 2020-10-20 NOTE — RESULT ENCOUNTER NOTE
Mychart comment sent to patient.  All the labs within normal limits   Future Appointments  10/20/2020 2:45 PM    Ritesh Page MD      [facility]   None  4/14/2021  8:00 AM    Steve Concepcion MD     [facility]

## 2021-01-02 DIAGNOSIS — E03.9 ACQUIRED HYPOTHYROIDISM: ICD-10-CM

## 2021-01-04 RX ORDER — LEVOTHYROXINE SODIUM 0.03 MG/1
TABLET ORAL
Qty: 12 TABLET | Refills: 3 | Status: SHIPPED | OUTPATIENT
Start: 2021-01-04 | End: 2021-12-20

## 2021-04-14 ENCOUNTER — OFFICE VISIT (OUTPATIENT)
Dept: FAMILY MEDICINE CLINIC | Age: 31
End: 2021-04-14
Payer: COMMERCIAL

## 2021-04-14 ENCOUNTER — TELEPHONE (OUTPATIENT)
Dept: FAMILY MEDICINE CLINIC | Age: 31
End: 2021-04-14

## 2021-04-14 VITALS
DIASTOLIC BLOOD PRESSURE: 82 MMHG | HEIGHT: 60 IN | SYSTOLIC BLOOD PRESSURE: 128 MMHG | HEART RATE: 91 BPM | WEIGHT: 112.2 LBS | TEMPERATURE: 98.4 F | OXYGEN SATURATION: 98 % | BODY MASS INDEX: 22.03 KG/M2

## 2021-04-14 DIAGNOSIS — Z11.59 ENCOUNTER FOR SCREENING FOR OTHER VIRAL DISEASES: ICD-10-CM

## 2021-04-14 DIAGNOSIS — E03.9 ACQUIRED HYPOTHYROIDISM: Primary | ICD-10-CM

## 2021-04-14 DIAGNOSIS — S01.00XD OPEN WOUND OF SCALP, UNSPECIFIED OPEN WOUND TYPE, SUBSEQUENT ENCOUNTER: ICD-10-CM

## 2021-04-14 DIAGNOSIS — R00.2 PALPITATIONS: ICD-10-CM

## 2021-04-14 PROCEDURE — 99214 OFFICE O/P EST MOD 30 MIN: CPT | Performed by: FAMILY MEDICINE

## 2021-04-14 ASSESSMENT — ENCOUNTER SYMPTOMS
COUGH: 0
WHEEZING: 0
NAUSEA: 0
SHORTNESS OF BREATH: 0
VOMITING: 0
CHEST TIGHTNESS: 0
CONSTIPATION: 0
ABDOMINAL PAIN: 0
DIARRHEA: 0
ABDOMINAL DISTENTION: 0

## 2021-04-14 ASSESSMENT — PATIENT HEALTH QUESTIONNAIRE - PHQ9
SUM OF ALL RESPONSES TO PHQ QUESTIONS 1-9: 0
SUM OF ALL RESPONSES TO PHQ QUESTIONS 1-9: 0
SUM OF ALL RESPONSES TO PHQ9 QUESTIONS 1 & 2: 0

## 2021-04-14 NOTE — PROGRESS NOTES
[x]Negative depression screening. PHQ Scores 4/14/2021 10/14/2020 4/14/2020 4/11/2019 4/12/2018 10/13/2016   PHQ2 Score 0 0 0 0 0 0   PHQ9 Score 0 0 0 0 0 0       Patient is due for hepatitis C screening. Farhad Villatoro 's indication is CDC recommendation. Prior to Visit Medications    Medication Sig Taking? Authorizing Provider   levothyroxine (SYNTHROID) 25 MCG tablet take 1 tablet by mouth EVERY 7 DAYS (ON SUNDAYS) TOGETHER WITH 88MCG TO = A TOTAL OF 113MCG ONLY ON SUNDAYS. ALL OTHER DAYS TAKE 88MCG Yes Annabel Segovia MD   levothyroxine (SYNTHROID) 88 MCG tablet Take 1 tablet by mouth Daily Yes Annabel Segovia MD       Social History     Tobacco Use    Smoking status: Never Smoker    Smokeless tobacco: Never Used   Substance Use Topics    Alcohol use: Yes     Alcohol/week: 2.0 standard drinks     Types: 2 Cans of beer per week     Comment: socially 2 beers every so often    Drug use: No         Review of Systems   Constitutional: Positive for fatigue and unexpected weight change. Negative for activity change, appetite change, chills, diaphoresis and fever. Respiratory: Negative for cough, chest tightness, shortness of breath and wheezing. Cardiovascular: Positive for palpitations. Negative for chest pain and leg swelling. Gastrointestinal: Negative for abdominal distention, abdominal pain, constipation, diarrhea, nausea and vomiting. Endocrine: Negative for cold intolerance, heat intolerance, polydipsia, polyphagia and polyuria. Skin: Positive for wound (scalp). Psychiatric/Behavioral: Negative for dysphoric mood. -vital signs stable and within normal limits  /82   Pulse 91   Temp 98.4 °F (36.9 °C) (Temporal)   Ht 5' (1.524 m)   Wt 112 lb 3.2 oz (50.9 kg)   LMP 03/17/2021 (Exact Date)   SpO2 98%   BMI 21.91 kg/m²      Physical Exam  Vitals signs and nursing note reviewed. Constitutional:       General: She is not in acute distress.      Appearance: Normal Status:   Future     Standing Expiration Date:   12/14/2021    TSH without Reflex     Standing Status:   Future     Standing Expiration Date:   12/14/2021    T4, Free     Standing Status:   Future     Standing Expiration Date:   12/14/2021    External Referral To Plastic Surgery     Referral Priority:   Routine     Referral Type:   Eval and Treat     Referral Reason:   Specialty Services Required     Referred to Provider:   Thierry Daniel     Requested Specialty:   Plastic Surgery     Number of Visits Requested:   1    EKG 12 Lead     Standing Status:   Future     Standing Expiration Date:   4/14/2022     Order Specific Question:   Reason for Exam?     Answer: Other     Comments:   Palpitations          An electronic signature was used to authenticate this note.   Electronically signed by Elias Romero MD on 4/19/2021 at 2:48 PM

## 2021-04-14 NOTE — TELEPHONE ENCOUNTER
[facility] called stating patient had already been in their office last year and saw Dr. Reno Mortimer. They state per Dr. Reno Mortimer patient needed a multispecialty team and he could not provide that for her. Please reference his progress note from 10/20/2020.

## 2021-04-14 NOTE — TELEPHONE ENCOUNTER
Please let patient know what answer we received from plastic surgeon office, and that I strongly advised the patient to go back to [facility] for the multispecialty care

## 2021-04-14 NOTE — PROGRESS NOTES
Visit Information    Have you changed or started any medications since your last visit including any over-the-counter medicines, vitamins, or herbal medicines? no   Are you having any side effects from any of your medications? -  no  Have you stopped taking any of your medications? Is so, why? -  no    Have you seen any other physician or provider since your last visit? No  Have you had any other diagnostic tests since your last visit? No  Have you been seen in the emergency room and/or had an admission to a hospital since we last saw you? No  Have you had your routine dental cleaning in the past 6 months? yes -     Have you activated your [employer] account? If not, what are your barriers?  Yes     Patient Care Team:  Josefina Calabrese MD as PCP - General (Family Medicine)  Josefina Calabrese MD as PCP - [facility]  Shobha Galarza MD as Surgeon (Cardiology)  Jamaal Andersen DO as Consulting Physician (Obstetrics & Gynecology)  Marla Reid MD as Surgeon (Plastic Surgery)  Zak Gil MD    Medical History Review  Past Medical, Family, and Social History reviewed and does contribute to the patient presenting condition    Health Maintenance   Topic Date Due    Hepatitis C screen  Never done    Cervical cancer screen  11/18/2017    TSH testing  10/19/2021    DTaP/Tdap/Td vaccine (3 - Td) 07/19/2022    Hepatitis B vaccine  Completed    Meningococcal (ACWY) vaccine  Completed    Flu vaccine  Completed    COVID-19 Vaccine  Completed    HIV screen  Completed    Hepatitis A vaccine  Aged Out    Hib vaccine  Aged Out    Pneumococcal 0-64 years Vaccine  Aged Out    Varicella vaccine  Discontinued

## 2021-04-15 ENCOUNTER — TELEPHONE (OUTPATIENT)
Dept: FAMILY MEDICINE CLINIC | Age: 31
End: 2021-04-15

## 2021-04-15 NOTE — TELEPHONE ENCOUNTER
Needs PA for plastic surgeon at [facility]    From   [place], [place] To   Elias Romero MD Sent   4/15/2021 10:37 AM   Hi- I talked to my insurance company about going to see that plastic surgeon at the [facility]. They said you need to send to the insurance a request for a pre-authorization to go to the plastic surgeon at the [facility]. The insurance said to include as much information as to why I should go to an out of network location versus see someone in [place]. They said to send what Dr. Sidney Ricardo wrote up about what services I need that would be better served at a [facility] in the request. Once the pre-authorization is approved, then I can set-up an appointment at [facility].

## 2021-04-16 NOTE — TELEPHONE ENCOUNTER
I need to work on this external referral to tertiary center at [facility]    Please contact the patient and ask what plastic surgeon she would like to see, then I'll place the referral and then we need to do PA . We do have all the documentation needed, I jsut need the name of provider, location and phone      I Elijah [address] [place] [address] plastic surgery, I put down some numbers, she needs to call and see which one she wants to go to, then she can send us a message back and I will order the referral which needs to be external and again needs PA so we have to be very sure from the beginning where she can to go.       I would suggest her to call these 3 places and see if they can help her first    [phone] Reggie Edmonds MD, PhD    Bryant Quinn MD           [facility] [place]   [phone]  Maren Luu MD    Plastic surgeon Hailey Corea MD  This is for another location [phone]

## 2021-04-19 ENCOUNTER — TELEPHONE (OUTPATIENT)
Dept: FAMILY MEDICINE CLINIC | Age: 31
End: 2021-04-19

## 2021-04-19 LAB
ANTIBODY: NORMAL
T4 FREE: 1.13
TSH SERPL DL<=0.05 MIU/L-ACNC: 3.66 UIU/ML

## 2021-04-19 NOTE — TELEPHONE ENCOUNTER
Patient returned call and information was given. She states she will either send a Cleankeyshart message or call the office with the name of the doctor, phone number, and facility.

## 2021-04-19 NOTE — TELEPHONE ENCOUNTER
New referral placed  Will need PA    Will print documentation tomorrow and ask staff to initiate the PA  \"Dr. Fina Armstrong   Phone: [phone]   Fax: [phone]\"

## 2021-04-20 DIAGNOSIS — E03.9 ACQUIRED HYPOTHYROIDISM: ICD-10-CM

## 2021-04-20 DIAGNOSIS — Z11.59 ENCOUNTER FOR SCREENING FOR OTHER VIRAL DISEASES: ICD-10-CM

## 2021-04-20 NOTE — RESULT ENCOUNTER NOTE
Please notify patient, normal labs, continue current treatment  Please advise the patient we faxed the referral yesterday, and we faxed all the information to her insurance [employer] today,  to be approved for her external referral to [facility], [facility]    Future Appointments  10/15/2021 8:00 AM    MD lizeth Akbar          [facility]

## 2021-04-20 NOTE — TELEPHONE ENCOUNTER
I placed all the documentation at the , please  Fax and do PA with insurance for need of tertiary center plastic surgeon, patient had evaluation locally, she was recommended to see tertiary center at [facility], [facility]

## 2021-04-20 NOTE — TELEPHONE ENCOUNTER
Insurance referral faxed to [place] as well as sent to Dr Melissa [place] office. Patient does not have an appointment at this time but this has been addressed and is scanned into her chart as well.

## 2021-05-10 DIAGNOSIS — E03.9 ACQUIRED HYPOTHYROIDISM: ICD-10-CM

## 2021-05-10 RX ORDER — LEVOTHYROXINE SODIUM 88 UG/1
88 TABLET ORAL DAILY
Qty: 90 TABLET | Refills: 3 | Status: SHIPPED | OUTPATIENT
Start: 2021-05-10 | End: 2022-01-05 | Stop reason: SDUPTHER

## 2021-05-10 NOTE — TELEPHONE ENCOUNTER
Please Approve or Refuse.   Send to Pharmacy per Pt's Request:      Next Visit Date:  10/15/2021   Last Visit Date: 4/14/2021    Hemoglobin A1C (%)   Date Value   10/12/2018 5.2             ( goal A1C is < 7)   BP Readings from Last 3 Encounters:   04/14/21 128/82   10/20/20 (!) 147/107   10/14/20 100/84          (goal 120/80)  BUN   Date Value Ref Range Status   10/19/2020 12 mg/dL Final     CREATININE   Date Value Ref Range Status   10/19/2020 0.81  Final     Potassium   Date Value Ref Range Status   10/19/2020 4.3 mmol/L Final

## 2021-10-22 RX ORDER — GABAPENTIN 300 MG/1
300 CAPSULE ORAL NIGHTLY
COMMUNITY
Start: 2021-10-11 | End: 2021-10-25

## 2021-10-22 NOTE — PROGRESS NOTES
[facility] PHYSICIANS  [facility] [address] [facility] [address].  [address] [address]  Dept:  [address] Heavenly Blake (:  1990) is a 33 y.o. female. Patient is here for evaluation of the following chief complaint(s):  Chief Complaint   Patient presents with    Annual Exam     NO CONCERNS    Immunizations     YES HAD COVID-19 VACCINE WILL SEND PROOF OF CARD        SUBJECTIVE/OBJECTIVE:  PUJA Jha is a 33 y.o. female patient. Patient is an established patient of Dr. Reg Brooks . Patient has a known history of open wound scalp hypothyroidism, GERD, vitamin B12 deficiency and anxiety. SCALP WOUND   Patient presented with a Non healing scalp since -patient was managed by a plastic surgeon at [address] [place] [address]-patient was eventually managed by another specialist at [facility] she had those procedures had made it worse-this happened and . Patient went back to [facility] and was advised to take some gabapentin to help with the pressure type pain that she has on her scalp area especially when she sleeps at night. Patient had started taking the gabapentin but does not feel that it is helping at all. Patient does appear to have some anxiety over this. She does not go out much she does not get her haircut. She does wear her hair down. She wears her hair all the time to cover up the bald area on her scalp. She is always worried that it will get infected every time. Patient have had a recent culture done in July but was told that there were no bacterial, fungal, or any type of infection growing. Inez Link is a 33 y.o. female patient  has a known hypothyroidism. Patient is currently on levothyroxine 88 and 25 mics. Recent symptoms: none. She denies none. Patient is  taking her medication consistently on an empty stomach.   Lab Results   Component Value Date    TSH 3.66 2021      PREVIOUS HISTORY OF GERD  Emilie CAPONE Heavenly Blake  admits to GERD symptoms of prolonged duration. Reported symptoms include heartburn. The patient denies dysphagia, vomiting. Patient is aware of some food triggers and habits that causes exacerbation of symptoms. Risk factors present for GERD include none. Current therapy none. VITAMIN B 12 /FOLATE DEFICIENCIES  Rufus Vu  is not taking Vitamin B12 supplementation. Emilie reports some numbness and tingling on scalp area. Vitamin B12 level had been checked in 2016. No supplementation given  Lab Results   Component Value Date    [id number] 426 07/16/2016     Lab Results   Component Value Date/Time    FOLATE 17.2 07/16/2016 12:00 AM     Emilie Rosas is due for annual preventative health screening including annual blood work. We will place the orders for these today. Alphonso Mendoza PHQ-9 Total Score: 0 (10/25/2021  8:26 AM)    VITAL SIGNS:  Vitals:    10/25/21 0826   BP: 122/72   Pulse: 106   Temp: 98 °F (36.7 °C)   SpO2: 100%   Weight: 111 lb 3.2 oz (50.4 kg)   Height: 5' (1.524 m)   Estimated body mass index is 21.72 kg/m² as calculated from the following:    Height as of this encounter: 5' (1.524 m). Weight as of this encounter: 111 lb 3.2 oz (50.4 kg). Review of Systems   Constitutional: Negative for chills and fever. HENT: Negative. Respiratory: Negative. Negative for shortness of breath. Cardiovascular: Negative. Negative for chest pain and palpitations. Gastrointestinal: Negative for abdominal pain. Endocrine: Negative. Musculoskeletal: Negative for arthralgias. Skin: Positive for color change and wound. Neurological: Negative for headaches. Psychiatric/Behavioral: Negative for sleep disturbance and suicidal ideas. The patient is nervous/anxious. Physical Exam  Vitals and nursing note reviewed. Constitutional:       Appearance: Normal appearance. HENT:      Head: Normocephalic.         Comments: Area of alopecia and open wound     Nose: Nose normal.   Cardiovascular:      Rate and Rhythm: Normal rate. Pulmonary:      Effort: Pulmonary effort is normal.   Skin:     General: Skin is warm and dry. Findings: Erythema, lesion and wound present. Comments: Moist open wound on her right occipital scalp areas with erythematous edges and mild serous drainage. Neurological:      Mental Status: She is alert and oriented to person, place, and time. Psychiatric:         Mood and Affect: Mood is anxious. MEDICAL HISTORY      Diagnosis Date    Anxiety 10/25/2021    GERD (gastroesophageal reflux disease)     History of ventricular tachycardia 10/1/2020    Hypothyroidism 2009    had thyroid radiation, was hyperthyroid, checked for celiac disease on 4/15/14 at . [facility] 39 abdominal pain 4/7/2015    Toxic multinodul goiter 2010    had iodine 131 therapy       MEDICATIONS  Prior to Visit Medications    Medication Sig Taking? Authorizing Provider   mupirocin (BACTROBAN) 2 % ointment Apply topically 3 times daily Yes Historical Provider, MD   DULoxetine (CYMBALTA) 30 MG extended release capsule Take 1 capsule by mouth daily Yes NICOLA Carlos - CNP   levothyroxine (SYNTHROID) 88 MCG tablet Take 1 tablet by mouth Daily Yes Ezio Jay MD   levothyroxine (SYNTHROID) 25 MCG tablet take 1 tablet by mouth EVERY 7 DAYS (ON SUNDAYS) TOGETHER WITH 88MCG TO = A TOTAL OF 113MCG ONLY ON SUNDAYS. ALL OTHER DAYS TAKE 88MCG Yes Ezio Jay MD     Controlled Substance Monitoring:  Acute and Chronic Pain Monitoring:   No flowsheet data found. ASSESSMENT/PLAN:  1. Open wound of scalp, unspecified open wound type, subsequent encounter  Failure to Improve  Stopped gabapentin  Start cymbalta  Consider selenium and heliocare  Research hair and nail specialists    - DULoxetine (CYMBALTA) 30 MG extended release capsule; Take 1 capsule by mouth daily  Dispense: 90 capsule; Refill: 2  - Culture, Aerobic and Anaerobic; Future    2.  Alopecia  Failure to Improve  Stopped gabapentin  Start cymbalta  Consider selenium and heliocare  Research hair and nail specialists      3. Acquired hypothyroidism  Stable  Continue current therapy. Continue to monitor TSH  Take them as instructed first thing in the morning before breakfast.  DISCUSSED AND ADVISED TO:  Do labs regularly every 6 months to monitor Thyroid hormones levels. Report for unintended weight loss or weight gain. Report for palpitations. Report intolerance to heat or cold. - TSH without Reflex; Future    4. Gastroesophageal reflux disease, unspecified whether esophagitis present  Resolved  DISCUSSED AND ADVISED TO:  Avoid food triggers. Stop eating large meals close to bedtime  Don't eat meals too close to bedtime  Avoid ASA, NSAID's, caffeine, peppermints, alcohol and tobacco.  Report for worsening symptoms. 5. B12 deficiency  Stable  Continue to evaluate    - Vitamin D 25 Hydroxy; Future  - Vitamin B12 & Folate; Future    6. Anxiety  Failure to Improve  Start cymbalta  Discussed how to recognize anxiety. Advised to relieve tension with exercise or a massage. Advised to get enough rest.  Advised to avoid alcohol, caffeine, nicotine, and illegal drugs. Which can increase anxiety level and cause sleep problems. 7. Hyperglycemia  Recommended    - CBC Auto Differential; Future  - Comprehensive Metabolic Panel, Fasting; Future  - Hemoglobin A1C; Future  - Urinalysis Reflex to Culture; Future    8. Elevated LDL cholesterol level  Recommended    - Comprehensive Metabolic Panel, Fasting; Future  - Lipid, Fasting; Future     On this date 10/25/2021 I have spent 30 minutes reviewing previous notes, test results and face to face with the patient discussing the diagnosis and importance of compliance with the treatment plan as well as documenting on the day of the visit. Return in about 3 months (around 1/25/2022) for Appt w/ Dr. Terell Cole, Chronic conditions.     This note was completed by using the assistance of a speech-recognition program. However, inadvertent computerized transcription errors may be present. Although every effort was made to ensure accuracy, no guarantees can be provided that every mistake has been identified and corrected by editing.   Electronically signed by NICOLA Crum CNP on 10/22/21 at 8:53 AM EDT     --NICOLA Crum CNP

## 2021-10-25 ENCOUNTER — TELEPHONE (OUTPATIENT)
Dept: FAMILY MEDICINE CLINIC | Age: 31
End: 2021-10-25

## 2021-10-25 ENCOUNTER — OFFICE VISIT (OUTPATIENT)
Dept: FAMILY MEDICINE CLINIC | Age: 31
End: 2021-10-25
Payer: COMMERCIAL

## 2021-10-25 VITALS
BODY MASS INDEX: 21.83 KG/M2 | OXYGEN SATURATION: 100 % | HEART RATE: 106 BPM | TEMPERATURE: 98 F | SYSTOLIC BLOOD PRESSURE: 122 MMHG | HEIGHT: 60 IN | DIASTOLIC BLOOD PRESSURE: 72 MMHG | WEIGHT: 111.2 LBS

## 2021-10-25 DIAGNOSIS — R73.9 HYPERGLYCEMIA: ICD-10-CM

## 2021-10-25 DIAGNOSIS — L65.9 ALOPECIA: ICD-10-CM

## 2021-10-25 DIAGNOSIS — E03.9 ACQUIRED HYPOTHYROIDISM: ICD-10-CM

## 2021-10-25 DIAGNOSIS — K21.9 GASTROESOPHAGEAL REFLUX DISEASE, UNSPECIFIED WHETHER ESOPHAGITIS PRESENT: ICD-10-CM

## 2021-10-25 DIAGNOSIS — E53.8 B12 DEFICIENCY: ICD-10-CM

## 2021-10-25 DIAGNOSIS — S01.00XD OPEN WOUND OF SCALP, UNSPECIFIED OPEN WOUND TYPE, SUBSEQUENT ENCOUNTER: Primary | ICD-10-CM

## 2021-10-25 DIAGNOSIS — E78.00 ELEVATED LDL CHOLESTEROL LEVEL: ICD-10-CM

## 2021-10-25 DIAGNOSIS — F41.9 ANXIETY: ICD-10-CM

## 2021-10-25 PROCEDURE — 81003 URINALYSIS AUTO W/O SCOPE: CPT | Performed by: FAMILY MEDICINE

## 2021-10-25 PROCEDURE — 99214 OFFICE O/P EST MOD 30 MIN: CPT | Performed by: FAMILY MEDICINE

## 2021-10-25 RX ORDER — DULOXETIN HYDROCHLORIDE 30 MG/1
30 CAPSULE, DELAYED RELEASE ORAL DAILY
Qty: 90 CAPSULE | Refills: 2 | Status: SHIPPED | OUTPATIENT
Start: 2021-10-25 | End: 2022-07-29

## 2021-10-25 ASSESSMENT — PATIENT HEALTH QUESTIONNAIRE - PHQ9
1. LITTLE INTEREST OR PLEASURE IN DOING THINGS: 0
SUM OF ALL RESPONSES TO PHQ9 QUESTIONS 1 & 2: 0
SUM OF ALL RESPONSES TO PHQ QUESTIONS 1-9: 0
2. FEELING DOWN, DEPRESSED OR HOPELESS: 0

## 2021-10-25 ASSESSMENT — ENCOUNTER SYMPTOMS
SHORTNESS OF BREATH: 0
RESPIRATORY NEGATIVE: 1
ABDOMINAL PAIN: 0
COLOR CHANGE: 1

## 2021-10-25 NOTE — TELEPHONE ENCOUNTER
----- Message from NICOLA Villanueva CNP sent at 10/25/2021  9:10 AM EDT -----  Please let her know that the nicotinamide's brand name is Heliocare. Thanks.

## 2021-10-25 NOTE — PROGRESS NOTES
Visit Information    Have you changed or started any medications since your last visit including any over-the-counter medicines, vitamins, or herbal medicines? no   Have you stopped taking any of your medications? Is so, why? -  no  Are you having any side effects from any of your medications? - no    Have you seen any other physician or provider since your last visit?  no   Have you had any other diagnostic tests since your last visit?  no   Have you been seen in the emergency room and/or had an admission in a hospital since we last saw you?  no   Have you had your routine dental cleaning in the past 6 months?  yes -      Do you have an active MyChart account? If no, what is the barrier?   Yes    Patient Care Team:  Emily Ramirez MD as PCP - General (Family Medicine)  Emily Ramirez MD as PCP - [facility]  Lilibeth Baltazar MD as Surgeon (Cardiology)  Felix Angel DO as Consulting Physician (Obstetrics & Gynecology)  Chi Vela MD as Surgeon (Plastic Surgery)  Hermelinda Mckay MD    Medical History Review  Past Medical, Family, and Social History reviewed and does contribute to the patient presenting condition    Health Maintenance   Topic Date Due    Cervical cancer screen  Never done    TSH testing  04/19/2022    DTaP/Tdap/Td vaccine (3 - Td or Tdap) 07/19/2022    Hepatitis B vaccine  Completed    Meningococcal (ACWY) vaccine  Completed    Flu vaccine  Completed    COVID-19 Vaccine  Completed    Hepatitis C screen  Completed    HIV screen  Completed    Hepatitis A vaccine  Aged Out    Hib vaccine  Aged Out    Pneumococcal 0-64 years Vaccine  Aged Out    Varicella vaccine  Discontinued

## 2021-10-25 NOTE — PATIENT INSTRUCTIONS
Patient Education        selenium  Pronunciation:  se ÁLVAREZ nedipak um  Brand:  Selenium TR  What is the most important information I should know about selenium? High doses or long-term use of selenium can lead to serious medical problems or death. Do not use more of this product than is recommended on the label. Follow all directions on your medicine label and package. Tell each of your healthcare providers about all your medical conditions, allergies, and all medicines you use. What is selenium? Selenium is mineral that is found in soil and occurs naturally in certain foods (such as whole grains, [place] nuts, sunflower seeds, and seafood). Selenium is not produced in the body, but it is needed for proper thyroid and immune system function. Selenium is used to treat or prevent selenium deficiency. Selenium has been used in alternative medicine as an aid to treat Hashimoto's thyroiditis (an autoimmune disorder of the thyroid), and to treat high cholesterol. Not all uses for selenium have been approved by the FDA. Selenium should not be used in place of medication prescribed for you by your doctor. Selenium may also be used for purposes not listed in this product guide. What should I discuss with my healthcare provider before taking selenium? You should not use this product if you are allergic to selenium. Using selenium long-term or at high doses may increase your risk of developing diabetes or other serious medical conditions. Ask your doctor about your specific risk. Before using selenium, talk to your healthcare provider. Your dose needs may be different if you have:  · chronic kidney disease (or if you are on dialysis);  · underactive thyroid; or  · skin cancer. Your dose needs may be different during pregnancy or while you are nursing. Do not use this product without medical advice if you are pregnant or breast-feeding a baby.   Do not give any herbal/health supplement to a child without medical advice. How should I take selenium? When considering the use of herbal supplements, seek the advice of your doctor. You may also consider consulting a practitioner who is trained in the use of herbal/health supplements. If you choose to use selenium, use it as directed on the package or as directed by your doctor, pharmacist, or other healthcare provider. Long-term use of selenium in doses greater than 400 micrograms (mcg) per day can lead to serious medical problems or death. Do not use more of this product than is recommended on the label. The recommended dietary allowance of selenium increases with age. Follow your healthcare provider's instructions. You may also consult the [address] [place] [address] \"Dietary Reference Intake\" or the U.S. Department of Agriculture's \"Dietary Reference Intake\" (formerly \"Recommended Daily Allowances\" or RDA) listings for more information. If you need surgery, tell the surgeon ahead of time that you are using selenium. You may need to stop using this product for at least 2 weeks before your surgery. This medication can affect the results of certain medical tests. Tell any doctor who treats you that you are using selenium. Store at room temperature away from moisture and heat. What happens if I miss a dose? Take the missed dose as soon as you remember. Skip the missed dose if it is almost time for your next scheduled dose. Do not take extra selenium to make up the missed dose. What happens if I overdose? Seek emergency medical attention or call the Poison Help line at 1-838.960.9714. An overdose of selenium can be fatal.  What should I avoid while taking selenium? Follow your doctor's instructions about any restrictions on food, beverages, or activity. What are the possible side effects of selenium? Get emergency medical help if you have any of these signs of an allergic reaction: hives; difficult breathing; swelling of your face, lips, tongue, or throat.   Long term use of high selenium doses can lead to dangerous side effects. Stop taking selenium and call your doctor at once if you have:  · nausea, vomiting;  · lack of energy, feeling irritable or very tired  · hair loss, mild rash, brittle or painful fingernails, or white streaks on the nails;  · tremors, feeling light-headed;  · muscle tenderness;  · flushing (warmth, redness, or tingly feeling);  · metallic taste, bad breath, strong body odor; or  · easy bruising or bleeding. This is not a complete list of side effects and others may occur. Call your doctor for medical advice about side effects. You may report side effects to FDA at 8-414-FDA-5152. What other drugs will affect selenium? Other drugs may interact with selenium, including prescription and over-the-counter medicines, vitamins, and herbal products. Tell each of your health care providers about all medicines you use now and any medicine you start or stop using. Where can I get more information? Consult with a licensed healthcare professional before using any herbal/health supplement. Whether you are treated by a medical doctor or a practitioner trained in the use of natural medicines/supplements, make sure all your healthcare providers know about all of your medical conditions and treatments. Remember, keep this and all other medicines out of the reach of children, never share your medicines with others, and use this medication only for the indication prescribed. Every effort has been made to ensure that the information provided by [address] is accurate, up-to-date, and complete, but no guarantee is made to that effect. Drug information contained herein may be time sensitive. Mac information has been compiled for use by healthcare practitioners and consumers in the [place] and therefore [facility] does not warrant that uses outside of the [place] are appropriate, unless specifically indicated otherwise.  [employer]'s drug information does not endorse drugs, diagnose patients or recommend therapy. [facility]'s drug information is an informational resource designed to assist licensed healthcare practitioners in caring for their patients and/or to serve consumers viewing this service as a supplement to, and not a substitute for, the expertise, skill, knowledge and judgment of healthcare practitioners. The absence of a warning for a given drug or drug combination in no way should be construed to indicate that the drug or drug combination is safe, effective or appropriate for any given patient. [facility] does not assume any responsibility for any aspect of healthcare administered with the aid of information [facility] provides. The information contained herein is not intended to cover all possible uses, directions, precautions, warnings, drug interactions, allergic reactions, or adverse effects. If you have questions about the drugs you are taking, check with your doctor, nurse or pharmacist.  Copyright 7162-9653 [address] [place] [address] Dr PLATT. Version: 1.02. Revision date: 6/26/2013. Care instructions adapted under license by [facility] [address] If you have questions about a medical condition or this instruction, always ask your healthcare professional. [place] [address] any warranty or liability for your use of this information.        NICOTINAMIDE

## 2021-10-28 DIAGNOSIS — S01.00XD OPEN WOUND OF SCALP, UNSPECIFIED OPEN WOUND TYPE, SUBSEQUENT ENCOUNTER: ICD-10-CM

## 2021-10-28 NOTE — RESULT ENCOUNTER NOTE
Please notify patient: Blood culture taken at [facility], not sure who took this for culture, it did show Staph aureus, pending final  If any sign of infection of the wound, then I can send antibiotic. Did she see specialist at [facility]?         Future Appointments  1/5/2022   8:00 AM    Allan Cortes MD     fp sc               [facility]

## 2021-10-29 DIAGNOSIS — T14.8XXA WOUND INFECTION: Primary | ICD-10-CM

## 2021-10-29 DIAGNOSIS — S01.00XD OPEN WOUND OF SCALP, UNSPECIFIED OPEN WOUND TYPE, SUBSEQUENT ENCOUNTER: Primary | ICD-10-CM

## 2021-10-29 DIAGNOSIS — L08.9 WOUND INFECTION: Primary | ICD-10-CM

## 2021-10-29 RX ORDER — DOXYCYCLINE HYCLATE 100 MG
100 TABLET ORAL 2 TIMES DAILY
Qty: 20 TABLET | Refills: 0 | Status: SHIPPED | OUTPATIENT
Start: 2021-10-29 | End: 2021-11-08

## 2021-10-29 NOTE — RESULT ENCOUNTER NOTE
Please notify patient: I will send a round of doxycycline for MSSA which came out of the culture.   I would like referral to wound care, please let me know she agrees, she will need to go in [facility] due to her insurance, [facility] or [facility] whichever she wants, please let me know if she needs referral for wound care    Doxycycline sent to the pharmacy please confirm the pharmacy    Future Appointments  1/5/2022   8:00 AM    [facility]yn[facility] Ventura MD     [facility]

## 2021-10-29 NOTE — RESULT ENCOUNTER NOTE
Please notify patient: Please let the patient know and also fax external referral printed in med room    Please tell her when she calls, to make appointment only with Dr. Sridevi Ma, who is plastic and reconstructive surgeon, so he will have more experience regarding her type of wound than any other providers at that practice, phone #[phone] on the website        Future Appointments  1/5/2022   8:00 AM    Madison Hatchet, MD fp sc               [facility]

## 2021-10-30 LAB
ALBUMIN SERPL-MCNC: 4.5 G/DL
ALP BLD-CCNC: 41 U/L
ALT SERPL-CCNC: 16 U/L
AST SERPL-CCNC: 18 U/L
AVERAGE GLUCOSE: 108
BASOPHILS ABSOLUTE: 0 /ΜL
BASOPHILS RELATIVE PERCENT: 0.4 %
BILIRUB SERPL-MCNC: 0.6 MG/DL (ref 0.1–1.4)
BILIRUBIN URINE: NORMAL
BLOOD, URINE: POSITIVE
BUN BLDV-MCNC: 12 MG/DL
CALCIUM SERPL-MCNC: 9.4 MG/DL
CHLORIDE BLD-SCNC: 106 MMOL/L
CHOLESTEROL, FASTING: 161
CLARITY: CLEAR
CO2: 27 MMOL/L
COLOR: YELLOW
CREAT SERPL-MCNC: 0.79 MG/DL
EOSINOPHILS ABSOLUTE: 0.1 /ΜL
EOSINOPHILS RELATIVE PERCENT: 2.5 %
FOLATE: NORMAL
GLUCOSE FASTING: 81 MG/DL
GLUCOSE URINE: NEGATIVE
HBA1C MFR BLD: 5.4 %
HCT VFR BLD CALC: 41 % (ref 36–46)
HDLC SERPL-MCNC: 65 MG/DL (ref 35–70)
HEMOGLOBIN: 13.7 G/DL (ref 12–16)
KETONES, URINE: NEGATIVE
LDL CHOLESTEROL CALCULATED: 88 MG/DL (ref 0–160)
LEUKOCYTE ESTERASE, URINE: NEGATIVE
LYMPHOCYTES ABSOLUTE: 0.8 /ΜL
LYMPHOCYTES RELATIVE PERCENT: 20.3 %
MCH RBC QN AUTO: 29.2 PG
MCHC RBC AUTO-ENTMCNC: 33.5 G/DL
MCV RBC AUTO: 87 FL
MONOCYTES ABSOLUTE: 0.6 /ΜL
MONOCYTES RELATIVE PERCENT: 14.3 %
NEUTROPHILS ABSOLUTE: 2.5 /ΜL
NEUTROPHILS RELATIVE PERCENT: 62.5 %
NITRITE, URINE: NEGATIVE
PDW BLD-RTO: 13.2 %
PH UA: 7 (ref 4.5–8)
PLATELET # BLD: 243 K/ΜL
PMV BLD AUTO: 8.2 FL
POTASSIUM SERPL-SCNC: 4.3 MMOL/L
PROTEIN UA: NEGATIVE
RBC # BLD: 4.69 10^6/ΜL
SODIUM BLD-SCNC: 141 MMOL/L
SPECIFIC GRAVITY UA: 1 (ref 1–1.03)
TOTAL PROTEIN: 6.8 G/DL (ref 6.4–8.2)
TRIGLYCERIDE, FASTING: 42
TSH SERPL DL<=0.05 MIU/L-ACNC: 3.73 UIU/ML
UROBILINOGEN, URINE: NORMAL
VITAMIN B-12: 438
VITAMIN D 25-HYDROXY: 42.2
VITAMIN D2, 25 HYDROXY: NORMAL
VITAMIN D3,25 HYDROXY: NORMAL
WBC # BLD: 4.1 10^3/ML

## 2021-11-01 ENCOUNTER — TELEPHONE (OUTPATIENT)
Dept: FAMILY MEDICINE CLINIC | Age: 31
End: 2021-11-01

## 2021-11-01 DIAGNOSIS — E53.8 B12 DEFICIENCY: ICD-10-CM

## 2021-11-01 DIAGNOSIS — R73.9 HYPERGLYCEMIA: ICD-10-CM

## 2021-11-01 DIAGNOSIS — E03.9 ACQUIRED HYPOTHYROIDISM: ICD-10-CM

## 2021-11-01 DIAGNOSIS — E78.00 ELEVATED LDL CHOLESTEROL LEVEL: ICD-10-CM

## 2021-11-01 DIAGNOSIS — L08.9 STAPH SKIN INFECTION: Primary | ICD-10-CM

## 2021-11-01 DIAGNOSIS — B95.8 STAPH SKIN INFECTION: Primary | ICD-10-CM

## 2021-11-01 PROCEDURE — 81003 URINALYSIS AUTO W/O SCOPE: CPT | Performed by: FAMILY MEDICINE

## 2021-11-01 RX ORDER — BACITRACIN, NEOMYCIN, POLYMYXIN B 400; 3.5; 5 [USP'U]/G; MG/G; [USP'U]/G
OINTMENT TOPICAL
Qty: 1 EACH | Refills: 2 | Status: SHIPPED | OUTPATIENT
Start: 2021-11-01

## 2021-11-01 NOTE — TELEPHONE ENCOUNTER
Please let the patient know of the recent results. These can also be discussed further on the future visits. PRELIMINARY CULTURE REPORT ON CULTURE STATES STAPHYLOCOCCUS INFECTION. I WILL WAIT FOR FINAL REPORT IN THE MEANTIME> YOU CAN USE OINTMENT TWICE A DAY. Patient's blood count is WNL    Lab Results   Component Value Date    WBC 4.1 10/29/2021    HGB 13.7 10/29/2021    HCT 41.0 10/29/2021    MCV 87 10/29/2021     10/29/2021    LYMPHOPCT 20.3 10/29/2021    RBC 4.69 10/29/2021    MCH 29.2 10/29/2021    MCHC 33.5 10/29/2021    RDW 13.2 10/29/2021       Patient's thyroid function is WNL    Lab Results   Component Value Date    TSH 3.73 10/29/2021       Patient's kidney function is WNL      Patient's liver function is WNL  .   Lab Results   Component Value Date     10/29/2021    K 4.3 10/29/2021     10/29/2021    CO2 27 10/29/2021    BUN 12 10/29/2021    CREATININE 0.79 10/29/2021    GLUCOSE 75 10/19/2020    CALCIUM 9.4 10/29/2021    PROT 6.8 10/29/2021    LABALBU 4.5 10/29/2021    BILITOT 0.6 10/29/2021    ALKPHOS 41 10/29/2021    AST 18 10/29/2021    ALT 16 10/29/2021    LABGLOM >60 10/19/2020       Patient's urinalysis results WNL    Lab Results   Component Value Date    COLORU Yellow 10/29/2021    NITRU Negative 10/29/2021    GLUCOSEU NEGATIVE 10/29/2021    KETUA Negative 10/29/2021    UROBILINOGEN Normal 10/29/2021    BILIRUBINUR  10/29/2021      Comment:      NEGATIVE    BILIRUBINUR negative 05/01/2019    PROTEINU Negative 10/29/2021    PHUR 7.0 10/29/2021    LEUKOCYTESUR Negative 10/29/2021     Diabetes screening WNL    Lab Results   Component Value Date/Time    LABA1C 5.4 10/29/2021 07:55 AM          Patient's lipids test results WNL    Lab Results   Component Value Date/Time    CHOLFAST 161 10/29/2021 07:55 AM    CHOL 153 10/19/2020 12:00 AM    HDL 65 10/29/2021 07:55 AM    TRIG 46 10/19/2020 12:00 AM    CHOLHDLRATIO 2.7 10/19/2020 12:00 AM    VLDL 9 10/19/2020 12:00 AM

## 2021-11-02 NOTE — RESULT ENCOUNTER NOTE
Please notify patient:  labs within normal limits  Except blood in the urine, I assume she is on her menstrual period      Future Appointments  1/5/2022   8:00 AM    Cherri Spurling, MD fp sc               [facility]

## 2021-12-19 DIAGNOSIS — E03.9 ACQUIRED HYPOTHYROIDISM: ICD-10-CM

## 2021-12-20 RX ORDER — LEVOTHYROXINE SODIUM 0.03 MG/1
TABLET ORAL
Qty: 12 TABLET | Refills: 3 | Status: SHIPPED | OUTPATIENT
Start: 2021-12-20

## 2022-01-05 ENCOUNTER — OFFICE VISIT (OUTPATIENT)
Dept: FAMILY MEDICINE CLINIC | Age: 32
End: 2022-01-05
Payer: COMMERCIAL

## 2022-01-05 VITALS
WEIGHT: 110 LBS | HEART RATE: 98 BPM | HEIGHT: 60 IN | BODY MASS INDEX: 21.6 KG/M2 | OXYGEN SATURATION: 99 % | SYSTOLIC BLOOD PRESSURE: 120 MMHG | DIASTOLIC BLOOD PRESSURE: 80 MMHG | TEMPERATURE: 97.6 F

## 2022-01-05 DIAGNOSIS — E03.9 ACQUIRED HYPOTHYROIDISM: Primary | ICD-10-CM

## 2022-01-05 DIAGNOSIS — R76.8 ANA POSITIVE: ICD-10-CM

## 2022-01-05 DIAGNOSIS — F41.9 ANXIETY: ICD-10-CM

## 2022-01-05 DIAGNOSIS — S01.00XD OPEN WOUND OF SCALP, UNSPECIFIED OPEN WOUND TYPE, SUBSEQUENT ENCOUNTER: ICD-10-CM

## 2022-01-05 PROCEDURE — 99214 OFFICE O/P EST MOD 30 MIN: CPT | Performed by: FAMILY MEDICINE

## 2022-01-05 RX ORDER — HYDROXYCHLOROQUINE SULFATE 200 MG/1
200 TABLET, FILM COATED ORAL 2 TIMES DAILY
COMMUNITY
End: 2022-08-16

## 2022-01-05 RX ORDER — LEVOTHYROXINE SODIUM 88 UG/1
88 TABLET ORAL DAILY
Qty: 90 TABLET | Refills: 3 | Status: SHIPPED | OUTPATIENT
Start: 2022-01-05

## 2022-01-05 ASSESSMENT — PATIENT HEALTH QUESTIONNAIRE - PHQ9
SUM OF ALL RESPONSES TO PHQ QUESTIONS 1-9: 0
1. LITTLE INTEREST OR PLEASURE IN DOING THINGS: 0
SUM OF ALL RESPONSES TO PHQ9 QUESTIONS 1 & 2: 0
2. FEELING DOWN, DEPRESSED OR HOPELESS: 0
SUM OF ALL RESPONSES TO PHQ QUESTIONS 1-9: 0

## 2022-01-05 ASSESSMENT — ENCOUNTER SYMPTOMS
VOMITING: 0
WHEEZING: 0
ABDOMINAL PAIN: 0
DIARRHEA: 0
CHEST TIGHTNESS: 0
SHORTNESS OF BREATH: 0
COUGH: 0
ABDOMINAL DISTENTION: 0
NAUSEA: 0
CONSTIPATION: 0

## 2022-01-05 ASSESSMENT — ANXIETY QUESTIONNAIRES
GAD7 TOTAL SCORE: 2
3. WORRYING TOO MUCH ABOUT DIFFERENT THINGS: 1-SEVERAL DAYS
1. FEELING NERVOUS, ANXIOUS, OR ON EDGE: 0-NOT AT ALL
6. BECOMING EASILY ANNOYED OR IRRITABLE: 0-NOT AT ALL
4. TROUBLE RELAXING: 0-NOT AT ALL
5. BEING SO RESTLESS THAT IT IS HARD TO SIT STILL: 0-NOT AT ALL
2. NOT BEING ABLE TO STOP OR CONTROL WORRYING: 1-SEVERAL DAYS
7. FEELING AFRAID AS IF SOMETHING AWFUL MIGHT HAPPEN: 0-NOT AT ALL

## 2022-01-05 NOTE — PROGRESS NOTES
Visit Information    Have you changed or started any medications since your last visit including any over-the-counter medicines, vitamins, or herbal medicines? no   Are you having any side effects from any of your medications? -  no  Have you stopped taking any of your medications? Is so, why? -  no    Have you seen any other physician or provider since your last visit? No  Have you had any other diagnostic tests since your last visit? No  Have you been seen in the emergency room and/or had an admission to a hospital since we last saw you? No  Have you had your routine dental cleaning in the past 6 months? yes     Have you activated your [employer] account? If not, what are your barriers?  Yes     Patient Care Team:  Siria Villarreal MD as PCP - General (Family Medicine)  Siria Villarreal MD as PCP - [facility]  Yina Mason MD as Surgeon (Cardiology)  Cathy Ronquillo DO as Consulting Physician (Obstetrics & Gynecology)  Michael Dunbar MD as Surgeon (Plastic Surgery)  Simin Figueroa MD    Medical History Review  Past Medical, Family, and Social History reviewed and does contribute to the patient presenting condition    Health Maintenance   Topic Date Due    Cervical cancer screen  Never done    DTaP/Tdap/Td vaccine (4 - Td or Tdap) 07/19/2022    Depression Screen  10/25/2022    TSH testing  10/29/2022    Hepatitis B vaccine  Completed    Meningococcal (ACWY) vaccine  Completed    Flu vaccine  Completed    COVID-19 Vaccine  Completed    Hepatitis C screen  Completed    HIV screen  Completed    Hepatitis A vaccine  Aged Out    Hib vaccine  Aged Out    Pneumococcal 0-64 years Vaccine  Aged Out    Varicella vaccine  Discontinued

## 2022-01-05 NOTE — PROGRESS NOTES
Sabra Hughes (:  1990) is a 33 y.o. female,Established patient, here for evaluation of the following chief complaint(s): Hypothyroidism, Other (scalp wound ), and Anxiety      ASSESSMENT/PLAN:    1. Acquired hypothyroidism  Stable  Well-controlled  Continue Synthroid 88 MCG 6 days a week, 88 MCG +25 MCG on Sundays  Advised to take Synthroid in the morning, on empty stomach, without any other meds and with a full glass of water.    -     levothyroxine (SYNTHROID) 88 MCG tablet; Take 1 tablet by mouth Daily, Disp-90 tablet, R-3Normal  2. 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 Anxiety  Has been improving  Continue Cymbalta, tolerated well  4. VIDHI positive  New, ongoing  Dermatologist placed referral to new rheumatologist at [facility], she has to make an appointment      Emilie received counseling on the following healthy behaviors: nutrition, exercise and medication adherence  Reviewed prior labs and health maintenance  Discussed use, benefit, and side effects of prescribed medications. Barriers to medication compliance addressed. Patient given educational materials - see patient instructions  All patient questions answered. Patient voiced understanding.   The patient's past medical,surgical, social, and family history as well as her current medications and allergies were reviewed as documented in today's encounter. Medications, labs, diagnostic studies, consultations and follow-up as documented in this encounter. Return in about 6 months (around 7/5/2022) for Face-2F-30mins PHYSICAL, VISION screen, PHQ9., PLEASE OBTAIN REPORT FROM RHEUM AND DERM. Data Unavailable    Future Appointments   Date Time Provider Andrzej Lewis   6/29/2022  3:15 PM Walter Driscoll MD [id number]         SUBJECTIVE/OBJECTIVE:    HPI      Hypothyroidism: Recent symptoms: anxiety. She denies fatigue, weight gain, weight loss, cold intolerance, heat intolerance, constipation, diarrhea, edema, tremor, palpitations and dysphagia. Patient is  taking her medication consistently on an empty stomach. TSH is Normal.    No results found for: [facility]  Lab Results   Component Value Date    TSH 3.73 10/29/2021    TSH 3.66 04/19/2021    TSH 2.24 10/19/2020     Wound on the scalp for several years, since 2012, with recent flareup in October 2021 when she was seen by my partner. Previously she [facility], then at [facility]. Most recently, she was put on gabapentin to help with the pressure pain at night, but didn't help, my partner had to change her to Cymbalta. Seeing Dr. Alexx Noel in [address] [place] [address] at wound care, and dermathology . Since the last time I have seen her she was started on Plaquenil, by dermatologist, she was told that she might have. On 1/3/2022 reviewed with patient, note reviewed through care everywhere in [address] [place] [address] . Size of the wound is improving significantly. Instructions given by wound care discoid lupus, VIDHI came positive most recently. Per her understanding, her wound is improving. She has been sleeping on her sides, does not have a good pillow, discussed foam pillow to try. Patient denies joint pains, joint aches, rashes anywhere else, butterfly rash. She never had these symptoms before.   The only wound she has is on the scalp and she underwent biopsies in the past which did not show lupus. Relatively stable weight, patient says she has been eating \" chicken\" I advised her to also increase the dairy products to increase the proteins in her diet    Wt Readings from Last 3 Encounters:   01/05/22 110 lb (49.9 kg)   10/25/21 111 lb 3.2 oz (50.4 kg)   04/14/21 112 lb 3.2 oz (50.9 kg)         Anxiety  She denies depression. Was started on Cymbalta in October, tolerated well, denies side effects. Patient says it has been helping with both, the pain and anxiety, she says she feels better. PHQ-2 Over the past 2 weeks, how often have you been bothered by any of the following problems? Little interest or pleasure in doing things: Not at all  Feeling down, depressed, or hopeless: Not at all  PHQ-2 Score: 0  PHQ-9 Over the past 2 weeks, how often have you been bothered by any of the following problems? PHQ-9 Total Score: 0  PHQ-9 Total Score: 0    PHQ Scores 1/5/2022 10/25/2021 4/14/2021 10/14/2020 4/14/2020 4/11/2019 4/12/2018   PHQ2 Score 0 0 0 0 0 0 0   PHQ9 Score 0 0 0 0 0 0 0         KLEBER-7 SCREENING 1/5/2022   Feeling nervous, anxious, or on edge 0-Not at all   Not able to stop or control worrying 1-Several days   Worrying too much about different things 1-Several days   Trouble relaxing 0-Not at all   Being so restless that it's hard to sit still 0-Not at all   Becoming easily annoyed or irritable 0-Not at all   Feeling afraid as if something awful might happen 0-Not at all   KLEBER-7 Total Score 2         Prior to Visit Medications    Medication Sig Taking? Authorizing Provider   hydroxychloroquine (PLAQUENIL) 200 MG tablet Take 200 mg by mouth 2 times daily Yes Historical Provider, MD   levothyroxine (SYNTHROID) 25 MCG tablet take 1 tablet by mouth EVERY 7 DAYS (ON SUNDAYS) TOGETHER WITH 88MCG TO = A TOTAL OF 113MCG ONLY ON SUNDAYS.  Machelle Boyce Yes Jo Romero MD neomycin-bacitracin-polymyxin (NEOSPORIN) 400-5-5000 ointment Apply topically 2 times daily. Yes NICOLA Carlos CNP   mupirocin (BACTROBAN) 2 % ointment Apply topically 3 times daily Yes Historical Provider, MD   DULoxetine (CYMBALTA) 30 MG extended release capsule Take 1 capsule by mouth daily Yes NICOLA Carlos CNP   levothyroxine (SYNTHROID) 88 MCG tablet Take 1 tablet by mouth Daily Yes Annabel Segovia MD       Social History     Tobacco Use    Smoking status: Never Smoker    Smokeless tobacco: Never Used   Substance Use Topics    Alcohol use: Yes     Alcohol/week: 2.0 standard drinks     Types: 2 Cans of beer per week     Comment: socially 2 beers every so often    Drug use: No         Review of Systems   Constitutional: Negative for activity change, appetite change, chills, diaphoresis, fatigue, fever and unexpected weight change. Eyes: Negative for visual disturbance. Respiratory: Negative for cough, chest tightness, shortness of breath and wheezing. Cardiovascular: Negative for chest pain, palpitations and leg swelling. Gastrointestinal: Negative for abdominal distention, abdominal pain, constipation, diarrhea, nausea and vomiting. Endocrine: Negative for cold intolerance, heat intolerance, polydipsia, polyphagia and polyuria. Skin: Positive for wound. Hematological: Does not bruise/bleed easily. Psychiatric/Behavioral: Negative for decreased concentration, dysphoric mood, self-injury, sleep disturbance and suicidal ideas. The patient is nervous/anxious.            -vital signs stable and within normal limits   /80   Pulse 98   Temp 97.6 °F (36.4 °C)   Ht 5' (1.524 m)   Wt 110 lb (49.9 kg)   LMP 12/26/2021 (Exact Date)   SpO2 99%   BMI 21.48 kg/m²      Physical Exam  Vitals and nursing note reviewed. Constitutional:       General: She is not in acute distress. Appearance: Normal appearance. She is well-developed. She is not diaphoretic. HENT:      Head: Normocephalic and atraumatic. Nose: Nose normal. No mucosal edema. Mouth/Throat:      Pharynx: No oropharyngeal exudate. Eyes:      General: Lids are normal. No scleral icterus. Right eye: No discharge. Left eye: No discharge. Conjunctiva/sclera: Conjunctivae normal.   Neck:      Thyroid: No thyromegaly. Cardiovascular:      Rate and Rhythm: Normal rate and regular rhythm. Heart sounds: Normal heart sounds. No murmur heard. Pulmonary:      Effort: Pulmonary effort is normal. No respiratory distress. Breath sounds: Normal breath sounds. No wheezing or rales. Chest:      Chest wall: No tenderness. Abdominal:      General: Bowel sounds are normal. There is no distension. Palpations: Abdomen is soft. There is no hepatomegaly or splenomegaly. Tenderness: There is no abdominal tenderness. Musculoskeletal:         General: No tenderness. Normal range of motion. Cervical back: Normal range of motion and neck supple. Right lower leg: No edema. Left lower leg: No edema. Skin:     General: Skin is warm and dry. Capillary Refill: Capillary refill takes less than 2 seconds. Findings: No rash. Comments: My prior measurement of scalp wound on 4/14/2021 \"12 cm x 8 cm x 1 mm\"  Most recent measurements per wound care from 1/3/2022 of the wound scalp 6.8 cm x 2 cm x 1 mm. 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 Neurological:      Mental Status: She is alert and oriented to person, place, and time. Cranial Nerves: No cranial nerve deficit. Motor: No abnormal muscle tone. Gait: Gait normal.      Deep Tendon Reflexes: Reflexes normal.      Reflex Scores:       Patellar reflexes are 2+ on the right side and 2+ on the left side. Psychiatric:         Mood and Affect: Mood normal.         Behavior: Behavior normal.         Thought Content:  Thought content normal.         Judgment: Judgment normal.             I personally reviewed testing . Discussed testing with the patient and all questions fully answered. Newly found VIDHI increased significant titer, speckled pattern  Otherwise labs within normal limits    Orders Only on 12/28/2021   Component Date Value Ref Range Status    Sjogren's Antibodies (SSA) 12/28/2021 Neg  NEG,NEGATI Final    Sjogren's Antibodies (SSB) 12/28/2021 Neg  NEG,NEGATI Final    VIDHI SCREEN 12/28/2021 1:320* <1:40,1:40 Final    Comment: Test performed using PABLO IFA VIDHI Hep-2 Test, a pre-standardized assay  designed for the qualitative and semi-quantitative detection of  antinuclear antibodies.  VIDHI Pattern 12/28/2021 SPECKLED   Final    Comment: The PABLO IFA VIDHI Hep-2 test utilizes the indirect fluorescent antibody  (IFA) method that has been used extensively for detecting the presence  of VIDHI in sera of patients with systemic lupus erythematosus (SLE), and  other clinically similar connective tissue disorders. In addition, VIDHI  may be associated with numerous drug-induced lupus syndromes which  clinically mimic the spontaneous form of SLE. Positive VIDHI may be found  in healthy individuals. It is therefore imperative that VIDHI results be  interpreted in light of the patients clinical condition by medical  authority.           Lab Results   Component Value Date    WBC 4.1 10/29/2021    HGB 13.7 10/29/2021    HCT 41.0 10/29/2021    MCV 87 10/29/2021     10/29/2021       Lab Results   Component Value Date     10/29/2021    K 4.3 10/29/2021     10/29/2021    CO2 27 10/29/2021    BUN 12 10/29/2021    CREATININE 0.79 10/29/2021    GLUCOSE 75 10/19/2020    CALCIUM 9.4 10/29/2021        Lab Results   Component Value Date    ALT 16 10/29/2021    AST 18 10/29/2021    ALKPHOS 41 10/29/2021    BILITOT 0.6 10/29/2021       Lab Results   Component Value Date    TSH 3.73 10/29/2021       Lab Results   Component Value Date CHOL 153 10/19/2020    CHOL 155 09/24/2016     Lab Results   Component Value Date    TRIG 46 10/19/2020    TRIG 46 09/24/2016     Lab Results   Component Value Date    HDL 65 10/29/2021    HDL 56 10/19/2020    HDL 60 09/24/2016     Lab Results   Component Value Date    LDLCALC 88 10/29/2021    LDLCALC 88 10/19/2020    LDLCALC 86 09/24/2016     Lab Results   Component Value Date    CHOLHDLRATIO 2.7 10/19/2020    CHOLHDLRATIO 2.6 09/24/2016       Lab Results   Component Value Date    LABA1C 5.4 10/29/2021    LABA1C 5.2 10/12/2018       Lab Results   Component Value Date    DQCQJXEF95 438 10/29/2021       Lab Results   Component Value Date    FOLATE 17.2 07/16/2016       Lab Results   Component Value Date    VITD25 42.2 08/29/2021       No orders of the defined types were placed in this encounter. Orders Placed This Encounter   Medications    levothyroxine (SYNTHROID) 88 MCG tablet     Sig: Take 1 tablet by mouth Daily     Dispense:  90 tablet     Refill:  3       Medications Discontinued During This Encounter   Medication Reason    levothyroxine (SYNTHROID) 88 MCG tablet REORDER         Return in about 6 months (around 7/5/2022) for Face-2F-30mins PHYSICAL, VISION screen, PHQ9., PLEASE OBTAIN REPORT FROM RHEUM AND DERM. On this date 1/5/2022 I have spent 35 minutes reviewing previous notes, test results and face to face with the patient discussing the diagnosis and importance of compliance with the treatment plan as well as documenting on the day of the visit. This note was completed by using the assistance of a speech-recognition program. However, inadvertent computerized transcription errors may be present. Although every effort was made to ensure accuracy, no guarantees can be provided that every mistake has been identified and corrected by editing. An electronic signature was used to authenticate this note.   Electronically signed by Elver Green MD on 1/5/2022  8:51 AM

## 2022-07-29 DIAGNOSIS — S01.00XD OPEN WOUND OF SCALP, UNSPECIFIED OPEN WOUND TYPE, SUBSEQUENT ENCOUNTER: ICD-10-CM

## 2022-07-29 DIAGNOSIS — F41.9 ANXIETY: ICD-10-CM

## 2022-07-29 RX ORDER — DULOXETIN HYDROCHLORIDE 30 MG/1
CAPSULE, DELAYED RELEASE ORAL
Qty: 90 CAPSULE | Refills: 2 | Status: SHIPPED | OUTPATIENT
Start: 2022-07-29

## 2022-07-29 NOTE — TELEPHONE ENCOUNTER
Please Approve or Refuse.   Send to Pharmacy per Pt's Request:      Next Visit Date:  8/16/2022   Last Visit Date: 1/5/2022    Hemoglobin A1C (%)   Date Value   10/29/2021 5.4   10/12/2018 5.2             ( goal A1C is < 7)   BP Readings from Last 3 Encounters:   01/05/22 120/80   10/25/21 122/72   04/14/21 128/82          (goal 120/80)  BUN   Date Value Ref Range Status   01/21/2022 11 7 - 25 mg/dL Final     Creatinine   Date Value Ref Range Status   01/21/2022 0.74 0.60 - 1.20 mg/dL Final   01/21/2022 23.0 mg/dL Final     Comment:     There are no established reference values for random urine specimens     Potassium   Date Value Ref Range Status   01/21/2022 4.3 3.5 - 5.1 meq/L Final

## 2022-08-16 ENCOUNTER — OFFICE VISIT (OUTPATIENT)
Dept: FAMILY MEDICINE CLINIC | Age: 32
End: 2022-08-16
Payer: COMMERCIAL

## 2022-08-16 VITALS
WEIGHT: 114.8 LBS | HEIGHT: 60 IN | SYSTOLIC BLOOD PRESSURE: 114 MMHG | OXYGEN SATURATION: 100 % | BODY MASS INDEX: 22.54 KG/M2 | TEMPERATURE: 98.2 F | HEART RATE: 104 BPM | DIASTOLIC BLOOD PRESSURE: 82 MMHG

## 2022-08-16 DIAGNOSIS — F41.9 ANXIETY: ICD-10-CM

## 2022-08-16 DIAGNOSIS — Z23 ENCOUNTER FOR IMMUNIZATION: ICD-10-CM

## 2022-08-16 DIAGNOSIS — E03.9 ACQUIRED HYPOTHYROIDISM: ICD-10-CM

## 2022-08-16 DIAGNOSIS — Z00.00 ENCOUNTER FOR WELL ADULT EXAM WITHOUT ABNORMAL FINDINGS: Primary | ICD-10-CM

## 2022-08-16 PROCEDURE — 99395 PREV VISIT EST AGE 18-39: CPT | Performed by: FAMILY MEDICINE

## 2022-08-16 PROCEDURE — 90715 TDAP VACCINE 7 YRS/> IM: CPT | Performed by: FAMILY MEDICINE

## 2022-08-16 PROCEDURE — 90471 IMMUNIZATION ADMIN: CPT | Performed by: FAMILY MEDICINE

## 2022-08-16 RX ORDER — UPADACITINIB 15 MG/1
TABLET, EXTENDED RELEASE ORAL
COMMUNITY
Start: 2022-06-06

## 2022-08-16 SDOH — ECONOMIC STABILITY: FOOD INSECURITY: WITHIN THE PAST 12 MONTHS, THE FOOD YOU BOUGHT JUST DIDN'T LAST AND YOU DIDN'T HAVE MONEY TO GET MORE.: NEVER TRUE

## 2022-08-16 SDOH — ECONOMIC STABILITY: FOOD INSECURITY: WITHIN THE PAST 12 MONTHS, YOU WORRIED THAT YOUR FOOD WOULD RUN OUT BEFORE YOU GOT MONEY TO BUY MORE.: NEVER TRUE

## 2022-08-16 ASSESSMENT — PATIENT HEALTH QUESTIONNAIRE - PHQ9
SUM OF ALL RESPONSES TO PHQ QUESTIONS 1-9: 0
1. LITTLE INTEREST OR PLEASURE IN DOING THINGS: 0
SUM OF ALL RESPONSES TO PHQ QUESTIONS 1-9: 0
2. FEELING DOWN, DEPRESSED OR HOPELESS: 0
SUM OF ALL RESPONSES TO PHQ9 QUESTIONS 1 & 2: 0

## 2022-08-16 ASSESSMENT — ENCOUNTER SYMPTOMS
DIARRHEA: 0
BLOOD IN STOOL: 0
TROUBLE SWALLOWING: 0
NAUSEA: 0
WHEEZING: 0
BACK PAIN: 0
CONSTIPATION: 0
VOMITING: 0
CHEST TIGHTNESS: 0
ABDOMINAL DISTENTION: 0
ABDOMINAL PAIN: 0
SHORTNESS OF BREATH: 0
COUGH: 0

## 2022-08-16 ASSESSMENT — SOCIAL DETERMINANTS OF HEALTH (SDOH): HOW HARD IS IT FOR YOU TO PAY FOR THE VERY BASICS LIKE FOOD, HOUSING, MEDICAL CARE, AND HEATING?: NOT HARD AT ALL

## 2022-08-16 ASSESSMENT — VISUAL ACUITY
OS_CC: 20/15
OD_CC: 20/20

## 2022-08-16 NOTE — PROGRESS NOTES
Visit Information    Have you changed or started any medications since your last visit including any over-the-counter medicines, vitamins, or herbal medicines? no   Have you stopped taking any of your medications? Is so, why? -  no  Are you having any side effects from any of your medications? - no    Have you seen any other physician or provider since your last visit?  no   Have you had any other diagnostic tests since your last visit?  no   Have you been seen in the emergency room and/or had an admission in a hospital since we last saw you?  no   Have you had your routine dental cleaning in the past 6 months?  yes -      Do you have an active MyChart account? If no, what is the barrier?   Yes    Patient Care Team:  Miguelito Silva MD as PCP - General (Family Medicine)  Miguelito Silva MD as PCP - [facility]  Severiano Martínez MD as Surgeon (Cardiology)  Di Cristobal DO as Consulting Physician (Obstetrics & Gynecology)  Mark Rush MD as Surgeon (Plastic Surgery)  Lorraine Goodson MD    Medical History Review  Past Medical, Family, and Social History reviewed and does contribute to the patient presenting condition    Health Maintenance   Topic Date Due    DTaP/Tdap/Td vaccine (4 - Td or Tdap) 07/19/2022    Cervical cancer screen  01/05/2023 (Originally 8/29/2009)    Flu vaccine (1) 09/01/2022    Depression Screen  01/05/2023    Hepatitis B vaccine  Completed    Meningococcal (ACWY) vaccine  Completed    COVID-19 Vaccine  Completed    Hepatitis C screen  Completed    HIV screen  Completed    Hepatitis A vaccine  Aged Out    Hib vaccine  Aged Out    Pneumococcal 0-64 years Vaccine  Aged Out    Varicella vaccine  Discontinued

## 2022-08-16 NOTE — PATIENT INSTRUCTIONS
Well Visit, Ages 25 to 48: Care Instructions  Overview     Well visits can help you stay healthy. Your doctor has checked your overall health and may have suggested ways to take good care of yourself. Your doctor also may have recommended tests. At home, you can help prevent illness withhealthy eating, regular exercise, and other steps. Follow-up care is a key part of your treatment and safety. Be sure to make and go to all appointments, and call your doctor if you are having problems. It's also a good idea to know your test results and keep alist of the medicines you take. How can you care for yourself at home? Get screening tests that you and your doctor decide on. Screening helps find diseases before any symptoms appear. Eat healthy foods. Choose fruits, vegetables, whole grains, protein, and low-fat dairy foods. Limit fat, especially saturated fat. Reduce salt in your diet. Limit alcohol. If you are a man, have no more than 2 drinks a day or 14 drinks a week. If you are a woman, have no more than 1 drink a day or 7 drinks a week. Get at least 30 minutes of physical activity on most days of the week. Walking is a good choice. You also may want to do other activities, such as running, swimming, cycling, or playing tennis or team sports. Discuss any changes in your exercise program with your doctor. Reach and stay at a healthy weight. This will lower your risk for many problems, such as obesity, diabetes, heart disease, and high blood pressure. Do not smoke or allow others to smoke around you. If you need help quitting, talk to your doctor about stop-smoking programs and medicines. These can increase your chances of quitting for good. Care for your mental health. It is easy to get weighed down by worry and stress. Learn strategies to manage stress, like deep breathing and mindfulness, and stay connected with your family and community. If you find you often feel sad or hopeless, talk with your doctor. Treatment can help. Talk to your doctor about whether you have any risk factors for sexually transmitted infections (STIs). You can help prevent STIs if you wait to have sex with a new partner (or partners) until you've each been tested for STIs. It also helps if you use condoms (male or female condoms) and if you limit your sex partners to one person who only has sex with you. Vaccines are available for some STIs, such as HPV. Use birth control if it's important to you to prevent pregnancy. Talk with your doctor about the choices available and what might be best for you. If you think you may have a problem with alcohol or drug use, talk to your doctor. This includes prescription medicines (such as amphetamines and opioids) and illegal drugs (such as cocaine and methamphetamine). Your doctor can help you figure out what type of treatment is best for you. Protect your skin from too much sun. When you're outdoors from 10 a.m. to 4 p.m., stay in the shade or cover up with clothing and a hat with a wide brim. Wear sunglasses that block UV rays. Even when it's cloudy, put broad-spectrum sunscreen (SPF 30 or higher) on any exposed skin. See a dentist one or two times a year for checkups and to have your teeth cleaned. Wear a seat belt in the car. When should you call for help? Watch closely for changes in your health, and be sure to contact your doctor if you have any problems or symptoms that concern you. Where can you learn more? Go to https://[facility]rian[facility]. org and sign in to your [employer] account. Enter P072 in the [facility] box to learn more about \"Well Visit, Ages 25 to 48: Care Instructions. \"     If you do not have an account, please click on the \"Sign Up Now\" link. Current as of: October 6, 2021               Content Version: 13.3  © 2006-2022 [employer]. Care instructions adapted under license by [facility] ([facility]).  If you have questions about a medical condition or this instruction, always ask your healthcare professional. [place] [address] any warranty or liability for your use of this information.

## 2022-08-16 NOTE — PROGRESS NOTES
2022      Drew Sanchez (:  1990) is a 33 y.o. female, here for a preventive medicine evaluation, Annual Exam   .      ASSESSMENT/PLAN:    1. Encounter for well adult exam without abnormal findings    Low carb, low fat diet, increase fruits and vegetables, and exercise 4-5 times a week 30-40 minutes a day, or walk 1-2 hours per day, or wear a pedometer and get at least 10,000 steps per day. Dental exam 2-3 times /year advised. Immunizations reviewed. Health Maintenance reviewed   Smoking cessation counseling given not to ever start smoking      Annual eye exam advised. 2. Encounter for immunization  -     Tdap, [address], (age 8 yrs+), IM  3. Acquired hypothyroidism  Stable  Continue Synthroid 88 MCG daily and additional 25 MCG daily on Sundays   Advised to take Synthroid in the morning, on empty stomach, without any other meds and with a full glass of water.     -     TSH; Future  -     T4, Free; Future  4. Anxiety  Improved with Cymbalta      Emilie received counseling on the following healthy behaviors: nutrition, exercise, and medication adherence  Reviewed prior labs and health maintenance  Discussed use, benefit, and side effects of prescribed medications. Barriers to medication compliance addressed. Patient given educational materials - see patient instructions  All patient questions answered. Patient voiced understanding. The patient's past medical, surgical, social, and family history as well as her  current medications and allergies were reviewed as documented in today's encounter. Medications, labs, diagnostic studies, consultations and follow-up as documented in thisencounter. Return in 6 months (on 2023) for Visit type extended 30 minutes chronic problems.     Data Unavailable    Future Appointments   Date Time Provider Andrzej Lewis   2023  8:00 AM MD lizeth [facility]           Patient Active Problem List   Diagnosis    Acquired hypothyroidism    Murmur, cardiac    Abnormal EKG    Congenital webbed neck    Wound, open, scalp with complication, subsequent encounter    GERD (gastroesophageal reflux disease)    B12 deficiency    Calculus of gallbladder without cholecystitis without obstruction    Family history of celiac disease in father    Klippel-Feil sequence    Skin graft (allograft) (autograft) failure    Chronic fatigue    Hemorrhagic ovarian cyst    History of ventricular tachycardia    Neoplasm of uncertain behavior of skin    Anxiety    Alopecia    Antinuclear factor positive       Prior to Visit Medications    Medication Sig Taking? Authorizing Provider   Upadacitinib ER (RINVOQ) 15 MG TB24  Yes Historical Provider, MD   DULoxetine (CYMBALTA) 30 MG extended release capsule take 1 capsule by mouth once daily Yes Lacey Camara MD   levothyroxine (SYNTHROID) 88 MCG tablet Take 1 tablet by mouth Daily Yes Lacey Camara MD   levothyroxine (SYNTHROID) 25 MCG tablet take 1 tablet by mouth EVERY 7 DAYS (ON SUNDAYS) TOGETHER WITH 88MCG TO = A TOTAL OF 113MCG ONLY ON SUNDAYS. ALL OTHER DAYS TAKE 88MCG Yes Lacey Camara MD   neomycin-bacitracin-polymyxin (NEOSPORIN) 400-5-5000 ointment Apply topically 2 times daily.  Yes Fabiola Flores, APRN - CNP        Allergies   Allergen Reactions    Peanut (Diagnostic)     Bactroban [Mupirocin Calcium] Rash    Sulfamethoxazole-Trimethoprim Rash       Past Medical History:   Diagnosis Date    Anxiety 10/25/2021    GERD (gastroesophageal reflux disease)     History of ventricular tachycardia 10/1/2020    Hypothyroidism 2009    had thyroid radiation, was hyperthyroid, checked for celiac disease on 4/15/14 at [facility] abdominal pain 4/7/2015    Toxic multinodul goiter 2010    had iodine 131 therapy        Past Surgical History:   Procedure Laterality Date    COSMETIC SURGERY  [id number]    scalp wound    [facility]    ureathal reimplantation .  Social History     Tobacco Use    Smoking status: Never    Smokeless tobacco: Never   Vaping Use    Vaping Use: Never used   Substance Use Topics    Alcohol use: Yes     Alcohol/week: 2.0 standard drinks     Types: 1 Glasses of wine, 1 Cans of beer per week     Comment: socially 2 beers every so often    Drug use: No       Family History   Problem Relation Age of Onset    Diabetes Mother     Celiac Disease Father     Diabetes Brother 25    Heart Disease Maternal Grandmother     Cancer Maternal Grandfather     Cancer Paternal Grandmother         lung    Other Paternal Grandmother         Alzheimer    Cancer Maternal Uncle     Breast Cancer Neg Hx     Colon Cancer Neg Hx     Uterine Cancer Neg Hx     Ovarian Cancer Neg Hx        ADVANCE DIRECTIVE: N, <no information>    SUBJECTIVE / Gwendolynn Kawasaki is here for Annual Exam       Hypothyroidism: Recent symptoms: anxiety. She denies fatigue, weight gain, weight loss, cold intolerance, heat intolerance, hair loss, dry skin, constipation, diarrhea, edema, tremor, palpitations, and dysphagia. Patient is  taking her medication consistently on an empty stomach. TSH is Normal.    No results found for: TSHREFLEX  Lab Results   Component Value Date    TSH 3.73 10/29/2021    TSH 3.66 04/19/2021    TSH 2.24 10/19/2020     Emilie reports wound on the scalp is healing  Has been having more itching on the scalp  Cymbalta \"is working\", anxiety is better, denies side effects     Urinary symptoms: no    Sexually active: No     Contraception: abstinence     no prior history of gyn screening tests  The patient has NO history of abnormal PAP    Last mammogram:will start at 33 yo  The patient has NO family history of breast cancer    Wears seatbelts: yes     Regular exercise: yes  Ever been transfused or tattooed?: no  The patient reports that domestic violence in her life is absent. Last eye exam: up to date: Yes    Abnormal visual screening. Vicky Villarreal  reports she is up-to-date with her eye exam.   Has contacts     Vision Screening    Right eye Left eye Both eyes   Without correction      With correction 20/20 20/15 20/13       Hearing:normal :Yes    Last dental exam and preventative dental cleaning: up to date : Yes    Nutritional assessment: Body mass index is 22.42 kg/m². Normal.     Weight is fluctuating a bit  Wt Readings from Last 3 Encounters:   08/16/22 114 lb 12.8 oz (52.1 kg)   01/05/22 110 lb (49.9 kg)   10/25/21 111 lb 3.2 oz (50.4 kg)       Healthful diet and Physical activity counseling to prevent CVD- low carb, low fat diet, increase fruits and vegetables, and exercise 4-5 times a day 30-40minutes a day discussed    Nicotine dependence. no  Counseling given: Yes      Alcohol use: yes - occasionally     Immunization History   Administered Date(s) Administered    COVID-19, PFIZER PURPLE top, DILUTE for use, (age 15 y+), 30mcg/0.3mL 02/12/2021, 03/05/2021, 11/13/2021    Hepatitis B Ped/Adol (Engerix-B, Recombivax HB) 04/18/2001, 06/14/2001, 06/10/2002    Influenza Virus Vaccine 09/30/2014, 10/01/2017, 10/01/2017, 11/10/2018, 10/09/2021    Influenza, AFLURIA, Jassa Dach (age 1 y+), MDV 10/12/2017    Influenza, FLUARIX, FLULAVAL, (age 10 mo+) AND AFLURIA, FLUZONE (age 1 y+), PF 10/13/2016, 10/28/2018, 10/11/2019, 10/03/2020, 10/09/2021, 10/14/2021    MMR 04/18/2001    Meningococcal MCV4P (Menactra) 04/13/2007    Td, (Adult) Not Adsorbed 01/01/2012    Tdap (Boostrix, Adacel) 04/13/2007, 07/19/2012, 08/16/2022       COVID-19 vaccine:  Yes     Tdap one time, then Td every 10 years-up to date:  No: given today    Influenza annually-up to date:  Yes    PPSV 23 in all adults 19-65 yo with chronic conditions,smokers, alcoholism,  nursing home residents; then PCV 13 at 73 yo-up to date: N/A    Menopause: No:   Patient's last menstrual period was 07/25/2022 (approximate).      Colon cancer screening recommended at 39years old    The patient has NO family history of colon cancer    Blood pressure is normal.  BP Readings from Last 3 Encounters:   08/16/22 114/82   01/05/22 120/80   10/25/21 122/72     Has FIT bit , HR max 100's   Down to 90 now during he encounter     Pulse is high, mildly high  Pulse Readings from Last 3 Encounters:   08/16/22 (!) 104   01/05/22 98   10/25/21 106       A1c is normal  Lab Results   Component Value Date    LABA1C 5.4 10/29/2021    LABA1C 5.2 10/12/2018       Lipid screening -within normal limits     Lab Results   Component Value Date    CHOL 153 10/19/2020    CHOL 155 09/24/2016     Lab Results   Component Value Date    TRIG 46 10/19/2020    TRIG 46 09/24/2016     Lab Results   Component Value Date    HDL 65 10/29/2021    HDL 56 10/19/2020    HDL 60 09/24/2016     Lab Results   Component Value Date    LDLCALC 88 10/29/2021    [address] 88 10/19/2020    LDLCALC 86 09/24/2016     Lab Results   Component Value Date    CHOLHDLRATIO 2.7 10/19/2020    CHOLHDLRATIO 2.6 09/24/2016       Cardiovascular risk is: low    The ASCVD Risk score (Tyler Councilman., et al., 2013) failed to calculate for the following reasons: The 2013 ASCVD risk score is only valid for ages 36 to 78    Hepatitis C screening- nonreactive       [x]Negative depression screening. PHQ-2 Over the past 2 weeks, how often have you been bothered by any of the following problems? Little interest or pleasure in doing things: Not at all  Feeling down, depressed, or hopeless: Not at all  PHQ-2 Score: 0  PHQ-9 Over the past 2 weeks, how often have you been bothered by any of the following problems?   PHQ-9 Total Score: 0  PHQ-9 Total Score: 0    PHQ Scores 8/16/2022 1/5/2022 10/25/2021 4/14/2021 10/14/2020 4/14/2020 4/11/2019   PHQ2 Score 0 0 0 0 0 0 0   PHQ9 Score 0 0 0 0 0 0 0       Health Maintenance   Topic Date Due    Cervical cancer screen  01/05/2023 (Originally 8/29/2009)    Flu vaccine (1) 09/01/2022    Depression Screen  01/05/2023    DTaP/Tdap/Td vaccine (5 - Td or Tdap) 08/16/2032 Hepatitis B vaccine  Completed    Meningococcal (ACWY) vaccine  Completed    COVID-19 Vaccine  Completed    Hepatitis C screen  Completed    HIV screen  Completed    Hepatitis A vaccine  Aged Out    Hib vaccine  Aged Out    Pneumococcal 0-64 years Vaccine  Aged Out    Varicella vaccine  Discontinued       -rest of complaints with corresponding details per ROS    Review of Systems   Constitutional:  Negative for activity change, appetite change, chills, diaphoresis, fatigue, fever and unexpected weight change. HENT:  Negative for congestion, dental problem, hearing loss and trouble swallowing. Eyes:  Positive for visual disturbance (has contacts). Respiratory:  Negative for cough, chest tightness, shortness of breath and wheezing. Cardiovascular:  Negative for chest pain, palpitations and leg swelling. Gastrointestinal:  Negative for abdominal distention, abdominal pain, blood in stool, constipation, diarrhea, nausea and vomiting. Endocrine: Negative for cold intolerance, heat intolerance, polydipsia, polyphagia and polyuria. Genitourinary:  Negative for difficulty urinating, dysuria, frequency, urgency and vaginal pain. Musculoskeletal:  Negative for arthralgias, back pain and myalgias. Skin:  Positive for wound (scalp, improving). Negative for rash. Allergic/Immunologic: Negative for environmental allergies. Neurological:  Negative for dizziness, tremors, weakness and numbness. Hematological:  Does not bruise/bleed easily. Psychiatric/Behavioral:  Negative for dysphoric mood and sleep disturbance. The patient is nervous/anxious (better).         -vital signs stable and within normal limits except mild tachycardia     /82   Pulse (!) 104   Temp 98.2 °F (36.8 °C)   Ht 5' (1.524 m)   Wt 114 lb 12.8 oz (52.1 kg)   LMP 07/25/2022 (Approximate)   SpO2 100%   BMI 22.42 kg/m²   Vitals:    08/16/22 1140 08/16/22 1145   BP: 114/82    Pulse: (!) 113 (!) 104   Temp: 98.2 °F (36.8 °C) SpO2: 100%    Weight: 114 lb 12.8 oz (52.1 kg)    Height: 5' (1.524 m)      Estimated body mass index is 22.42 kg/m² as calculated from the following:    Height as of this encounter: 5' (1.524 m). Weight as of this encounter: 114 lb 12.8 oz (52.1 kg). Physical Exam  Vitals and nursing note reviewed. Constitutional:       General: She is not in acute distress. Appearance: Normal appearance. She is well-developed. She is not diaphoretic. HENT:      Head: Normocephalic and atraumatic. Right Ear: Hearing, tympanic membrane, ear canal and external ear normal.      Left Ear: Hearing, tympanic membrane, ear canal and external ear normal.      Nose: No mucosal edema. Mouth/Throat:      Comments: I did not examine the mouth due to coronavirus pandemic and wearing masks. Eyes:      General: Lids are normal. No scleral icterus. Right eye: No discharge. Left eye: No discharge. Extraocular Movements: Extraocular movements intact. Conjunctiva/sclera: Conjunctivae normal.   Neck:      Thyroid: No thyromegaly. Cardiovascular:      Rate and Rhythm: Normal rate and regular rhythm. Heart sounds: Normal heart sounds. No murmur heard. Comments: HR 90 during exam  Pulmonary:      Effort: Pulmonary effort is normal. No respiratory distress. Breath sounds: Normal breath sounds. No wheezing or rales. Chest:      Chest wall: No tenderness. Abdominal:      General: Bowel sounds are normal. There is no distension. Palpations: Abdomen is soft. There is no hepatomegaly or splenomegaly. Tenderness: There is no abdominal tenderness. Musculoskeletal:         General: No tenderness. Normal range of motion. Cervical back: Normal range of motion and neck supple. Right lower leg: No edema. Left lower leg: No edema. Skin:     General: Skin is warm and dry. Capillary Refill: Capillary refill takes less than 2 seconds. Findings: No rash. This Encounter   Procedures    Tdap, BOOSTRIX, (age 8 yrs+), IM    TSH     Standing Status:   Future     Standing Expiration Date:   8/16/2023    T4, Free     Standing Status:   Future     Standing Expiration Date:   8/16/2023         This note was completed by using the assistance of a speech-recognition program. However, inadvertent computerized transcription errors may be present. Although every effort was made to ensure accuracy, no guarantees can be provided that every mistake has been identified and corrected by editing. An electronic signature was used to authenticate this note.     Electronically signed by Meera Jordan MD on 8/16/2022 at 11:08 PM

## 2022-08-30 LAB
T4 FREE: 1.06
TSH SERPL DL<=0.05 MIU/L-ACNC: 2.51 UIU/ML

## 2022-09-01 DIAGNOSIS — E03.9 ACQUIRED HYPOTHYROIDISM: ICD-10-CM

## 2022-12-15 DIAGNOSIS — E03.9 ACQUIRED HYPOTHYROIDISM: ICD-10-CM

## 2022-12-15 RX ORDER — LEVOTHYROXINE SODIUM 0.03 MG/1
TABLET ORAL
Qty: 12 TABLET | Refills: 3 | Status: SHIPPED | OUTPATIENT
Start: 2022-12-15

## 2022-12-15 NOTE — TELEPHONE ENCOUNTER
Please Approve or Refuse.   Send to Pharmacy per Pt's Request:      Next Visit Date:  2/17/2023   Last Visit Date: 8/16/2022    Hemoglobin A1C (%)   Date Value   10/29/2021 5.4   10/12/2018 5.2             ( goal A1C is < 7)   BP Readings from Last 3 Encounters:   08/16/22 114/82   01/05/22 120/80   10/25/21 122/72          (goal 120/80)  BUN   Date Value Ref Range Status   01/21/2022 11 7 - 25 mg/dL Final     Creatinine   Date Value Ref Range Status   01/21/2022 0.74 0.60 - 1.20 mg/dL Final   01/21/2022 23.0 mg/dL Final     Comment:     There are no established reference values for random urine specimens     Potassium   Date Value Ref Range Status   01/21/2022 4.3 3.5 - 5.1 meq/L Final

## 2023-02-02 DIAGNOSIS — E03.9 ACQUIRED HYPOTHYROIDISM: ICD-10-CM

## 2023-02-02 RX ORDER — LEVOTHYROXINE SODIUM 88 UG/1
TABLET ORAL
Qty: 90 TABLET | Refills: 3 | Status: SHIPPED | OUTPATIENT
Start: 2023-02-02

## 2023-02-17 ENCOUNTER — OFFICE VISIT (OUTPATIENT)
Dept: FAMILY MEDICINE CLINIC | Age: 33
End: 2023-02-17
Payer: COMMERCIAL

## 2023-02-17 VITALS
WEIGHT: 118 LBS | HEIGHT: 60 IN | SYSTOLIC BLOOD PRESSURE: 120 MMHG | DIASTOLIC BLOOD PRESSURE: 80 MMHG | HEART RATE: 106 BPM | OXYGEN SATURATION: 99 % | TEMPERATURE: 98 F | BODY MASS INDEX: 23.16 KG/M2

## 2023-02-17 DIAGNOSIS — R53.82 CHRONIC FATIGUE: ICD-10-CM

## 2023-02-17 DIAGNOSIS — E03.9 ACQUIRED HYPOTHYROIDISM: Primary | ICD-10-CM

## 2023-02-17 PROBLEM — D48.5 NEOPLASM OF UNCERTAIN BEHAVIOR OF SKIN: Status: RESOLVED | Noted: 2020-10-20 | Resolved: 2023-02-17

## 2023-02-17 PROCEDURE — 99214 OFFICE O/P EST MOD 30 MIN: CPT | Performed by: FAMILY MEDICINE

## 2023-02-17 SDOH — ECONOMIC STABILITY: FOOD INSECURITY: WITHIN THE PAST 12 MONTHS, YOU WORRIED THAT YOUR FOOD WOULD RUN OUT BEFORE YOU GOT MONEY TO BUY MORE.: NEVER TRUE

## 2023-02-17 SDOH — ECONOMIC STABILITY: HOUSING INSECURITY
IN THE LAST 12 MONTHS, WAS THERE A TIME WHEN YOU DID NOT HAVE A STEADY PLACE TO SLEEP OR SLEPT IN A SHELTER (INCLUDING NOW)?: NO

## 2023-02-17 SDOH — ECONOMIC STABILITY: INCOME INSECURITY: HOW HARD IS IT FOR YOU TO PAY FOR THE VERY BASICS LIKE FOOD, HOUSING, MEDICAL CARE, AND HEATING?: NOT HARD AT ALL

## 2023-02-17 SDOH — ECONOMIC STABILITY: FOOD INSECURITY: WITHIN THE PAST 12 MONTHS, THE FOOD YOU BOUGHT JUST DIDN'T LAST AND YOU DIDN'T HAVE MONEY TO GET MORE.: NEVER TRUE

## 2023-02-17 ASSESSMENT — PATIENT HEALTH QUESTIONNAIRE - PHQ9
SUM OF ALL RESPONSES TO PHQ QUESTIONS 1-9: 0
SUM OF ALL RESPONSES TO PHQ QUESTIONS 1-9: 0
1. LITTLE INTEREST OR PLEASURE IN DOING THINGS: 0
SUM OF ALL RESPONSES TO PHQ QUESTIONS 1-9: 0
2. FEELING DOWN, DEPRESSED OR HOPELESS: 0
SUM OF ALL RESPONSES TO PHQ QUESTIONS 1-9: 0
SUM OF ALL RESPONSES TO PHQ9 QUESTIONS 1 & 2: 0

## 2023-02-17 ASSESSMENT — ENCOUNTER SYMPTOMS
CONSTIPATION: 0
CHEST TIGHTNESS: 0
DIARRHEA: 0
COUGH: 0
VOMITING: 0
ABDOMINAL PAIN: 0
NAUSEA: 0
WHEEZING: 0
ABDOMINAL DISTENTION: 0
SHORTNESS OF BREATH: 0

## 2023-02-17 NOTE — PROGRESS NOTES
Glen Hannon (:  1990) is a 27 y.o. female,Established patient, here for evaluation of the following chief complaint(s): Hypothyroidism and Fatigue      ASSESSMENT/PLAN:    1. Acquired hypothyroidism  Improved with medication  Reviewing records in [facility] clinic, patient did not have Hashimoto thyroiditis test  Celiac testing was negative at [facility] clinic in   Patient underwent thyroid radiation as she was hyperthyroid at [facility] clinic in [id number]  I do not see any ultrasound of the thyroid for the past few years, since   Recheck labs, rule out Hashimoto thyroiditis, rule out thyroid nodules  Advised to take Synthroid in the morning, on empty stomach, without any other meds and with a full glass of water. Continue Synthroid 88 mcg daily and additional 25 mcg on Sundays  -     TSH; Future  -     T4, Free; Future  -     Anti-Thyroglobulin Antibody; Future  -     Thyroid Peroxidase Antibody; Future  -     US THYROID; Future  2. Chronic fatigue  Failing to resolve  Will do basic labs to rule out certain common medical conditions: hematologic, renal, hepatic, electrolyte imbalances, thyroid imbalance  To start vitamin B12 from over-the-counter 1000 mcg daily  Continue high-protein diet due to nonhealing wound on the scalp for many years  -     CBC; Future  -     Comprehensive Metabolic Panel; Future  -     Vitamin D 25 Hydroxy; Future    Emilie received counseling on the following healthy behaviors: nutrition, exercise, and medication adherence  Reviewed prior labs and health maintenance  Discussed use, benefit, and side effects of prescribed medications. Barriers to medication compliance addressed. Patient given educational materials - see patient instructions  All patient questions answered. Patient voiced understanding. The patient's past medical,surgical, social, and family history as well as her current medications and allergies were reviewed as documented in today's encounter. Medications, labs, diagnostic studies, consultations and follow-up as documented in this encounter. Return in about 6 months (around 8/17/2023) for Visit type PHYSICAL, VISION screen, PHQ9. .    Data Unavailable    Future Appointments   Date Time Provider Andrzej Lewis   8/30/2023  8:30 AM Senthil Morel MD fp sc [facility]         SUBJECTIVE/OBJECTIVE:    HPI    Hypothyroidism: Recent symptoms: fatigue and weight gain. She denies weight loss, cold intolerance, heat intolerance, hair loss, dry skin, constipation, diarrhea, edema, anxiety, tremor, palpitations, and dysphagia. Patient is  taking her medication consistently on an empty stomach. TSH is Normal.  Records reviewed through [facility], at [facility], thyroid receptor antibodies were done, I do not see Hashimoto antibodies completed. No results found for: [facility]  Lab Results   Component Value Date    TSH 2.51 08/30/2022    TSH 3.73 10/29/2021    TSH 3.66 04/19/2021       Fatigue on and off for several months, not getting better  She works a lot, patient says she has been eating more proteins, her scalp wound has been healing mostly, she says it is mostly dry. Has been sleeping well. She denies fever, chills, night sweats. Has been up-to-date with all the vaccines. She works with children. Was able to gain weight 4 pounds since the last time, but does have more close today    Wt Readings from Last 3 Encounters:   02/17/23 118 lb (53.5 kg)   08/16/22 114 lb 12.8 oz (52.1 kg)   01/05/22 110 lb (49.9 kg)       Denies depression     [x]Negative depression screening. PHQ-2 Over the past 2 weeks, how often have you been bothered by any of the following problems? Little interest or pleasure in doing things: Not at all  Feeling down, depressed, or hopeless: Not at all  PHQ-2 Score: 0  PHQ-9 Over the past 2 weeks, how often have you been bothered by any of the following problems?   PHQ-9 Total Score: 0  PHQ-9 Total Score: 0    PHQ Scores 2/17/2023 8/16/2022 1/5/2022 10/25/2021 4/14/2021 10/14/2020 4/14/2020   PHQ2 Score 0 0 0 0 0 0 0   PHQ9 Score 0 0 0 0 0 0 0         Prior to Visit Medications    Medication Sig Taking? Authorizing Provider   levothyroxine (SYNTHROID) 88 MCG tablet take 1 tablet by mouth once daily Yes Edgar Evans MD   levothyroxine (SYNTHROID) 25 MCG tablet take 1 tablet by mouth EVERY 7 DAYS (ON SUNDAYS) TOGETHER WITH 88MCG TO = A TOTAL OF 113MCG ONLY ON SUNDAYS. Chayo Guthrie Yes Edgar Evans MD   Upadacitinib ER (RINVOQ) 15 MG TB24  Yes Historical Provider, MD   DULoxetine (CYMBALTA) 30 MG extended release capsule take 1 capsule by mouth once daily Yes Edgar Evans MD   neomycin-bacitracin-polymyxin (NEOSPORIN) 400-5-5000 ointment Apply topically 2 times daily. Patient not taking: Reported on 2/17/2023  Fabiola Flores, APRN - CNP       Social History     Tobacco Use    Smoking status: Never    Smokeless tobacco: Never   Vaping Use    Vaping Use: Never used   Substance Use Topics    Alcohol use: Yes     Alcohol/week: 2.0 standard drinks     Types: 1 Glasses of wine, 1 Cans of beer per week     Comment: socially 2 beers every so often    Drug use: No         Review of Systems   Constitutional:  Negative for activity change, appetite change, chills, diaphoresis, fatigue, fever and unexpected weight change. Respiratory:  Negative for cough, chest tightness, shortness of breath and wheezing. Cardiovascular:  Negative for chest pain, palpitations and leg swelling. Gastrointestinal:  Negative for abdominal distention, abdominal pain, constipation, diarrhea, nausea and vomiting. Endocrine: Negative for cold intolerance, heat intolerance, polydipsia, polyphagia and polyuria. Skin:  Positive for wound (scalp). Negative for rash. Neurological:  Negative for headaches. Hematological:  Does not bruise/bleed easily.    Psychiatric/Behavioral:  Negative for dysphoric mood and sleep disturbance. The patient is not nervous/anxious.          -vital signs stable and within normal limits except mild tachycardia  /80   Pulse (!) 106   Temp 98 °F (36.7 °C)   Ht 5' (1.524 m)   Wt 118 lb (53.5 kg)   LMP 02/03/2023   SpO2 99%   BMI 23.05 kg/m²      Physical Exam  Vitals and nursing note reviewed. Constitutional:       General: She is not in acute distress. Appearance: Normal appearance. She is well-developed. She is not diaphoretic. HENT:      Head: Normocephalic and atraumatic. Right Ear: External ear normal.      Left Ear: External ear normal.      Mouth/Throat:      Comments: I did not examine the mouth due to coronavirus pandemic and wearing masks    Eyes:      General: Lids are normal. No scleral icterus. Right eye: No discharge. Left eye: No discharge. Extraocular Movements: Extraocular movements intact. Conjunctiva/sclera: Conjunctivae normal.   Neck:      Thyroid: No thyromegaly. Cardiovascular:      Rate and Rhythm: Normal rate and regular rhythm. Heart sounds: Normal heart sounds. No murmur heard. Comments: Central HR 90 BMP  Pulmonary:      Effort: Pulmonary effort is normal. No respiratory distress. Breath sounds: Normal breath sounds. No wheezing or rales. Chest:      Chest wall: No tenderness. Abdominal:      General: Bowel sounds are normal. There is no distension. Palpations: Abdomen is soft. There is no hepatomegaly or splenomegaly. Tenderness: There is no abdominal tenderness. Musculoskeletal:         General: No tenderness. Normal range of motion. Cervical back: Normal range of motion and neck supple. Right lower leg: No edema. Left lower leg: No edema. Skin:     General: Skin is warm and dry. Capillary Refill: Capillary refill takes less than 2 seconds. Findings: No rash.       Comments: Scalp exam deferred, patient has long hair, she is ready to go back to work Neurological:      Mental Status: She is alert and oriented to person, place, and time. Cranial Nerves: No cranial nerve deficit. Motor: No abnormal muscle tone. Psychiatric:         Mood and Affect: Mood normal.         Behavior: Behavior normal.         Thought Content: Thought content normal.         Judgment: Judgment normal.           I personally reviewed testing . Discussed testing with the patient and all questions fully answered.   Labs reviewed within normal limits except vitamin B12 towards the lower side, advised to restart taking B12 supplement, as she does have a history of vitamin B12 deficiency  Otherwise labs within normal limits    Orders Only on 09/01/2022   Component Date Value Ref Range Status    T4 Free 08/30/2022 1.06   Final    TSH 08/30/2022 2.51  uIU/mL Final       Lab Results   Component Value Date    WBC 4.93 01/21/2022    HGB 14.1 01/21/2022    HCT 41.8 01/21/2022    MCV 85.7 01/21/2022     01/21/2022       Lab Results   Component Value Date/Time     01/21/2022 09:47 AM    K 4.3 01/21/2022 09:47 AM     01/21/2022 09:47 AM    CO2 28 01/21/2022 09:47 AM    BUN 11 01/21/2022 09:47 AM    CREATININE 0.74 01/21/2022 09:47 AM    CREATININE 23.0 01/21/2022 09:47 AM    GLUCOSE 77 01/21/2022 09:47 AM    CALCIUM 9.8 01/21/2022 09:47 AM        Lab Results   Component Value Date    ALT 15 01/21/2022    AST 21 01/21/2022    ALKPHOS 37 01/21/2022    BILITOT 0.8 01/21/2022       Lab Results   Component Value Date    TSH 2.51 08/30/2022       Lab Results   Component Value Date    CHOL 153 10/19/2020    CHOL 155 09/24/2016     Lab Results   Component Value Date    TRIG 46 10/19/2020    TRIG 46 09/24/2016     Lab Results   Component Value Date    HDL 65 10/29/2021    HDL 56 10/19/2020    HDL 60 09/24/2016     Lab Results   Component Value Date    LDLCALC 88 10/29/2021    [address] 88 10/19/2020    LDLCALC 86 09/24/2016     Lab Results   Component Value Date    CHOLHDLRATIO 2.7 10/19/2020    CHOLHDLRATIO 2.6 09/24/2016       Lab Results   Component Value Date    LABA1C 5.4 10/29/2021    LABA1C 5.2 10/12/2018       Lab Results   Component Value Date    AKRBHIRN11 438 10/29/2021       Lab Results   Component Value Date    FOLATE 17.2 07/16/2016       Lab Results   Component Value Date    VITD25 42.2 08/29/2021       Orders Placed This Encounter   Procedures    US THYROID     Standing Status:   Future     Standing Expiration Date:   2/17/2024    CBC     Standing Status:   Future     Standing Expiration Date:   2/17/2024    Comprehensive Metabolic Panel     Standing Status:   Future     Standing Expiration Date:   2/17/2024    Vitamin D 25 Hydroxy     Standing Status:   Future     Standing Expiration Date:   2/17/2024    TSH     Standing Status:   Future     Standing Expiration Date:   2/17/2024    T4, Free     Standing Status:   Future     Standing Expiration Date:   2/17/2024    Anti-Thyroglobulin Antibody     Standing Status:   Future     Standing Expiration Date:   2/17/2024    Thyroid Peroxidase Antibody     Standing Status:   Future     Standing Expiration Date:   2/17/2024       No orders of the defined types were placed in this encounter. There are no discontinued medications. On this date 2/17/2023 I have spent 30 minutes reviewing previous notes, test results and face to face with the patient discussing the diagnosis and importance of compliance with the treatment plan as well as documenting on the day of the visit. This note was completed by using the assistance of a speech-recognition program. However, inadvertent computerized transcription errors may be present. Although every effort was made to ensure accuracy, no guarantees can be provided that every mistake has been identified and corrected by editing. An electronic signature was used to authenticate this note.   Electronically signed by Addis Macias MD on 2/17/2023  8:38 AM

## 2023-02-17 NOTE — PROGRESS NOTES
Visit Information    Have you changed or started any medications since your last visit including any over-the-counter medicines, vitamins, or herbal medicines? no   Are you having any side effects from any of your medications? -  no  Have you stopped taking any of your medications? Is so, why? -  no    Have you seen any other physician or provider since your last visit? No  Have you had any other diagnostic tests since your last visit? No  Have you been seen in the emergency room and/or had an admission to a hospital since we last saw you? No  Have you had your routine dental cleaning in the past 6 months? yes     Have you activated your [employer] account? If not, what are your barriers?  Yes     Patient Care Team:  Cherri Spurling, MD as PCP - General (Family Medicine)  Cherri Spurling, MD as PCP - Empaneled Provider  Jenniffer Clement MD as Surgeon (Cardiology)  Dash Bonner MD as Surgeon (Estephanie Chakraborty)  Agus Sanchez MD    Medical History Review  Past Medical, Family, and Social History reviewed and does contribute to the patient presenting condition    Health Maintenance   Topic Date Due    Cervical cancer screen  Never done    Depression Screen  08/16/2023    DTaP/Tdap/Td vaccine (5 - Td or Tdap) 08/16/2032    Meningococcal (ACWY) vaccine  Completed    Flu vaccine  Completed    COVID-19 Vaccine  Completed    Hepatitis C screen  Completed    HIV screen  Completed    Hepatitis A vaccine  Aged Out    Hib vaccine  Aged Out    Pneumococcal 0-64 years Vaccine  Aged Out    Varicella vaccine  Discontinued

## 2023-02-22 ENCOUNTER — HOSPITAL ENCOUNTER (OUTPATIENT)
Dept: ULTRASOUND IMAGING | Age: 33
Discharge: HOME | End: 2023-02-24
Payer: COMMERCIAL

## 2023-02-22 DIAGNOSIS — E03.9 ACQUIRED HYPOTHYROIDISM: ICD-10-CM

## 2023-02-22 PROCEDURE — 76536 US EXAM OF HEAD AND NECK: CPT

## 2023-02-22 NOTE — RESULT ENCOUNTER NOTE
Please notify patient:  Ultrasound thyroid shows 2 small nodules which are recommended to be biopsied, if she is agreeable I can place the biopsy for interventional radiologist at [facility], let me know if she is agreeable  This is the usual procedure to get the biopsy  Another option would be a referral to ENT. Please let me know what she decides      IMPRESSION:  Heterogeneous thyroid with nodules as detailed.   18 mm TI-RADS 5 nodule right  thyroid and 15 mm TI-RADS 4 nodule left thyroid meet criteria for FNA     RECOMMENDATIONS:  NODULE 1:  ACR TI-RADS TR5:     Recommend:  Ultrasound-guided fine needle aspiration.     NODULE 2:  ACR TI-RADS TR4:     Recommend:  Ultrasound-guided fine needle aspiration.         Future Appointments  8/30/2023  8:30 AM    MD lizeth Jones               [facility]

## 2023-02-23 ENCOUNTER — PATIENT MESSAGE (OUTPATIENT)
Dept: FAMILY MEDICINE CLINIC | Age: 33
End: 2023-02-23

## 2023-02-23 DIAGNOSIS — E03.9 ACQUIRED HYPOTHYROIDISM: ICD-10-CM

## 2023-02-23 DIAGNOSIS — E04.1 THYROID NODULE: Primary | ICD-10-CM

## 2023-02-23 NOTE — RESULT ENCOUNTER NOTE
Noted, referral placed  Future Appointments  8/30/2023  8:30 AM    Mekhi Keating MD     [facility]

## 2023-02-23 NOTE — PROGRESS NOTES
Diagnosis Orders   1. Thyroid nodule  Margie Walsh MD, Otolaryngology, [place]      2.  Acquired hypothyroidism  Margie Walsh MD, Otolaryngology, [place]           Future Appointments   Date Time Provider [facility] [place]   8/30/2023  8:30 AM Kaila Phan MD fp [facility]

## 2023-02-27 NOTE — TELEPHONE ENCOUNTER
From: Dr. Doc Diaz  To: Carito Soriano  Sent: 2/23/2023 5:54 PM EST  Subject: Thyroid nodules    Emilie,    I placed a referral to ENT, please call and make an appointment    Please inquire if they do thyroid biopsy or not. I am not sure but most of the times they do    If they do not do thyroid biopsy please let me know and I will refer you to the interventional radiologist here at [facility]      Also the antibodies in [address] shows no Hashimoto thyroiditis and no autoimmune thyroid disorder, which is good    Associated Diagnoses    Thyroid nodule - Primary      Acquired hypothyroidism        Scheduling Instructions    [facility], [address] [place] [address], [address] [place] [address]   [phone]         If you have any questions, please let me know.     Doc Diaz MD  [address] [facility]  [address] [address]  Dept: [phone]  Dept Fax: [phone]

## 2023-03-09 ENCOUNTER — HOSPITAL ENCOUNTER (OUTPATIENT)
Dept: INTERVENTIONAL RADIOLOGY/VASCULAR | Age: 33
Discharge: HOME | End: 2023-03-11
Payer: COMMERCIAL

## 2023-03-09 DIAGNOSIS — E04.1 THYROID NODULE: ICD-10-CM

## 2023-03-09 LAB
CASE NUMBER:: NORMAL
CASE NUMBER:: NORMAL
SPECIMEN DESCRIPTION: NORMAL
SPECIMEN DESCRIPTION: NORMAL

## 2023-03-09 PROCEDURE — 88173 CYTOPATH EVAL FNA REPORT: CPT

## 2023-03-09 PROCEDURE — 10006 FNA BX W/US GDN EA ADDL: CPT

## 2023-03-09 PROCEDURE — 10005 FNA BX W/US GDN 1ST LES: CPT

## 2023-03-09 PROCEDURE — 2709999900 IR BIOPSY THYROID PERC CORE NEEDLE

## 2023-03-09 PROCEDURE — 88305 TISSUE EXAM BY PATHOLOGIST: CPT

## 2023-03-09 PROCEDURE — 88172 CYTP DX EVAL FNA 1ST EA SITE: CPT

## 2023-03-09 NOTE — RESULT ENCOUNTER NOTE
Noted  FINDINGS:  Insonation of the ultrasound demonstrates diffuse heterogeneous echotexture  and very thyroid gland which corresponds to the history of small prior iodine  radiotherapy treatment for previous hyperthyroidism.     It is of note that the both thyroid nodules had very coarse echogenic  calcification with posterior shadowing corresponds to history of prior  treatment and calcified small thyroid gland.     IMPRESSION:  Successful ultrasound-guided fine-needle aspiration of the recommended to  thyroid nodules in the left and right inferior thyroid lower lobes.   Future Appointments  8/30/2023  8:30 AM    Yomaira Eric MD     fp sc               [facility]

## 2023-03-09 NOTE — RESULT ENCOUNTER NOTE
Noted, biopsy pending  Future Appointments  8/30/2023  8:30 AM    José Antonio Paez MD     [facility]

## 2023-03-09 NOTE — OR NURSING
3 FNA from Left, lower thyroid obtained, per pathologist adequate for analysis. 3 FNA from Right, lower thyroid obtained, per pathologist adequate for analysis.

## 2023-03-10 LAB — SURGICAL PATHOLOGY REPORT: NORMAL

## 2023-03-10 NOTE — RESULT ENCOUNTER NOTE
Please notify patient: biopsy nondiagnostic , means not enough cells in the aspirate    Keep appointment with ENT    Future Appointments  8/30/2023  8:30 AM    MD lizeth Youngblood

## 2023-04-16 DIAGNOSIS — F41.9 ANXIETY: ICD-10-CM

## 2023-04-16 DIAGNOSIS — S01.00XD OPEN WOUND OF SCALP, UNSPECIFIED OPEN WOUND TYPE, SUBSEQUENT ENCOUNTER: ICD-10-CM

## 2023-04-17 RX ORDER — DULOXETIN HYDROCHLORIDE 30 MG/1
CAPSULE, DELAYED RELEASE ORAL
Qty: 90 CAPSULE | Refills: 2 | Status: SHIPPED | OUTPATIENT
Start: 2023-04-17

## 2023-04-17 NOTE — TELEPHONE ENCOUNTER
Please Approve or Refuse.   Send to Pharmacy per Pt's Request:      Next Visit Date:  8/30/2023   Last Visit Date: 2/17/2023    Hemoglobin A1C (%)   Date Value   10/29/2021 5.4   10/12/2018 5.2             ( goal A1C is < 7)   BP Readings from Last 3 Encounters:   02/17/23 120/80   08/16/22 114/82   01/05/22 120/80          (goal 120/80)  BUN   Date Value Ref Range Status   01/21/2022 11 7 - 25 mg/dL Final     Creatinine   Date Value Ref Range Status   01/21/2022 0.74 0.60 - 1.20 mg/dL Final   01/21/2022 23.0 mg/dL Final     Comment:     There are no established reference values for random urine specimens     Potassium   Date Value Ref Range Status   01/21/2022 4.3 3.5 - 5.1 meq/L Final

## 2023-08-30 ENCOUNTER — OFFICE VISIT (OUTPATIENT)
Dept: FAMILY MEDICINE CLINIC | Age: 33
End: 2023-08-30
Payer: COMMERCIAL

## 2023-08-30 VITALS
DIASTOLIC BLOOD PRESSURE: 86 MMHG | WEIGHT: 121 LBS | HEART RATE: 107 BPM | HEIGHT: 60 IN | OXYGEN SATURATION: 98 % | TEMPERATURE: 97.6 F | BODY MASS INDEX: 23.75 KG/M2 | SYSTOLIC BLOOD PRESSURE: 124 MMHG

## 2023-08-30 DIAGNOSIS — R53.83 OTHER FATIGUE: ICD-10-CM

## 2023-08-30 DIAGNOSIS — R00.0 TACHYCARDIA: ICD-10-CM

## 2023-08-30 DIAGNOSIS — E04.1 THYROID NODULE: ICD-10-CM

## 2023-08-30 DIAGNOSIS — E03.9 ACQUIRED HYPOTHYROIDISM: ICD-10-CM

## 2023-08-30 DIAGNOSIS — I47.29 NONSUSTAINED VENTRICULAR TACHYCARDIA (HCC): ICD-10-CM

## 2023-08-30 DIAGNOSIS — Z00.01 ENCOUNTER FOR WELL ADULT EXAM WITH ABNORMAL FINDINGS: Primary | ICD-10-CM

## 2023-08-30 PROBLEM — E04.2 MULTIPLE THYROID NODULES: Status: ACTIVE | Noted: 2023-08-30

## 2023-08-30 PROCEDURE — 99213 OFFICE O/P EST LOW 20 MIN: CPT | Performed by: FAMILY MEDICINE

## 2023-08-30 PROCEDURE — 99395 PREV VISIT EST AGE 18-39: CPT | Performed by: FAMILY MEDICINE

## 2023-08-30 RX ORDER — FLUOCINONIDE TOPICAL SOLUTION USP, 0.05% 0.5 MG/ML
SOLUTION TOPICAL
COMMUNITY
Start: 2023-06-06

## 2023-08-30 SDOH — ECONOMIC STABILITY: FOOD INSECURITY: WITHIN THE PAST 12 MONTHS, THE FOOD YOU BOUGHT JUST DIDN'T LAST AND YOU DIDN'T HAVE MONEY TO GET MORE.: NEVER TRUE

## 2023-08-30 SDOH — ECONOMIC STABILITY: FOOD INSECURITY: WITHIN THE PAST 12 MONTHS, YOU WORRIED THAT YOUR FOOD WOULD RUN OUT BEFORE YOU GOT MONEY TO BUY MORE.: NEVER TRUE

## 2023-08-30 SDOH — ECONOMIC STABILITY: INCOME INSECURITY: HOW HARD IS IT FOR YOU TO PAY FOR THE VERY BASICS LIKE FOOD, HOUSING, MEDICAL CARE, AND HEATING?: NOT HARD AT ALL

## 2023-08-30 ASSESSMENT — ENCOUNTER SYMPTOMS
ABDOMINAL PAIN: 0
DIARRHEA: 0
CONSTIPATION: 0
WHEEZING: 0
VOMITING: 0
TROUBLE SWALLOWING: 0
SHORTNESS OF BREATH: 0
BLOOD IN STOOL: 0
CHEST TIGHTNESS: 0
COUGH: 0
BACK PAIN: 0
NAUSEA: 0
ABDOMINAL DISTENTION: 0

## 2023-08-30 ASSESSMENT — PATIENT HEALTH QUESTIONNAIRE - PHQ9
SUM OF ALL RESPONSES TO PHQ9 QUESTIONS 1 & 2: 0
SUM OF ALL RESPONSES TO PHQ QUESTIONS 1-9: 0
2. FEELING DOWN, DEPRESSED OR HOPELESS: 0
SUM OF ALL RESPONSES TO PHQ QUESTIONS 1-9: 0
SUM OF ALL RESPONSES TO PHQ QUESTIONS 1-9: 0
1. LITTLE INTEREST OR PLEASURE IN DOING THINGS: 0
SUM OF ALL RESPONSES TO PHQ QUESTIONS 1-9: 0

## 2023-08-30 ASSESSMENT — VISUAL ACUITY
OD_CC: 20/20
OS_CC: 20/15

## 2023-08-30 NOTE — PROGRESS NOTES
Visit Information    Have you changed or started any medications since your last visit including any over-the-counter medicines, vitamins, or herbal medicines? no   Have you stopped taking any of your medications? Is so, why? -  no  Are you having any side effects from any of your medications? - no    Have you seen any other physician or provider since your last visit? yes -    Have you had any other diagnostic tests since your last visit? yes -    Have you been seen in the emergency room and/or had an admission in a hospital since we last saw you?  no   Have you had your routine dental cleaning in the past 6 months?  yes -      Do you have an active MyChart account? If no, what is the barrier?   Yes    Patient Care Team:  Savannah Pope MD as PCP - General (Family Medicine)  Savannah Pope MD as PCP - Empaneled Provider  Joe Hilton MD as Surgeon (Cardiology)  Avelina Mora MD as Surgeon ([facility])  Marcus Ramos MD    Medical History Review  Past Medical, Family, and Social History reviewed and does contribute to the patient presenting condition    Health Maintenance   Topic Date Due    Cervical cancer screen  Never done    Flu vaccine (1) 08/01/2023    Depression Screen  02/17/2024    DTaP/Tdap/Td vaccine (5 - Td or Tdap) 08/16/2032    Meningococcal (ACWY) vaccine  Completed    COVID-19 Vaccine  Completed    Hepatitis C screen  Completed    HIV screen  Completed    Hepatitis A vaccine  Aged Out    Hib vaccine  Aged Out    Pneumococcal 0-64 years Vaccine  Aged Out    Hepatitis B vaccine  Discontinued    Varicella vaccine  Discontinued
supper     Dispense:  90 tablet     Refill:  0         Medications Discontinued During This Encounter   Medication Reason    neomycin-bacitracin-polymyxin (NEOSPORIN) 400-5-5000 ointment Therapy completed         Orders Placed This Encounter   Procedures    CBC     Standing Status:   Future     Standing Expiration Date:   8/30/2024    Comprehensive Metabolic Panel     Standing Status:   Future     Standing Expiration Date:   8/30/2024    Magnesium     Standing Status:   Future     Standing Expiration Date:   8/30/2024    TSH     Standing Status:   Future     Standing Expiration Date:   8/30/2024    T4, Free     Standing Status:   Future     Standing Expiration Date:   8/30/2024    [facility] ([facility] Hashimoto) - Shelby Nguyễn MD, Cardiology, [place]     Referral Priority:   Routine     Referral Type:   Eval and Treat     Referral Reason:   Specialty Services Required     Referred to Provider:   Shelby Nguyễn MD     Requested Specialty:   Cardiology     Number of Visits Requested:   1    [place] OFFICE/OUTPATIENT ESTABLISHED LOW MDM 20-29 MIN         This note was completed by using the assistance of a speech-recognition program. However, inadvertent computerized transcription errors may be present. Although every effort was made to ensure accuracy, no guarantees can be provided that every mistake has been identified and corrected by editing. An electronic signature was used to authenticate this note.     Electronically signed by Savannah Pope MD on 8/30/2023 at 6:35 PM

## 2023-09-02 LAB
ALBUMIN SERPL-MCNC: 4.4 G/DL
ALP BLD-CCNC: 39 U/L
ALT SERPL-CCNC: 21 U/L
ANION GAP SERPL CALCULATED.3IONS-SCNC: 12 MMOL/L
AST SERPL-CCNC: 25 U/L
BASOPHILS ABSOLUTE: NORMAL
BASOPHILS RELATIVE PERCENT: NORMAL
BILIRUB SERPL-MCNC: 0.5 MG/DL (ref 0.1–1.4)
BUN BLDV-MCNC: 9 MG/DL
CALCIUM SERPL-MCNC: 9.4 MG/DL
CHLORIDE BLD-SCNC: 107 MMOL/L
CO2: 22 MMOL/L
CREAT SERPL-MCNC: 0.75 MG/DL
EGFR: >90
EOSINOPHILS ABSOLUTE: NORMAL
EOSINOPHILS RELATIVE PERCENT: NORMAL
GLUCOSE BLD-MCNC: 81 MG/DL
HCT VFR BLD CALC: 39.9 % (ref 36–46)
HEMOGLOBIN: 13.3 G/DL (ref 12–16)
LYMPHOCYTES ABSOLUTE: NORMAL
LYMPHOCYTES RELATIVE PERCENT: NORMAL
MAGNESIUM: 2 MG/DL
MCH RBC QN AUTO: 29.3 PG
MCHC RBC AUTO-ENTMCNC: 33.3 G/DL
MCV RBC AUTO: 88 FL
MONOCYTES ABSOLUTE: NORMAL
MONOCYTES RELATIVE PERCENT: NORMAL
NEUTROPHILS ABSOLUTE: NORMAL
NEUTROPHILS RELATIVE PERCENT: NORMAL
PLATELET # BLD: 204 K/ΜL
PMV BLD AUTO: 8.5 FL
POTASSIUM SERPL-SCNC: 3.8 MMOL/L
RBC # BLD: 4.53 10^6/ΜL
SODIUM BLD-SCNC: 141 MMOL/L
T4 FREE: 1.13
TOTAL PROTEIN: 7.2
TSH SERPL DL<=0.05 MIU/L-ACNC: 3.15 UIU/ML
WBC # BLD: 4.1 10^3/ML

## 2023-09-06 DIAGNOSIS — I47.29 NONSUSTAINED VENTRICULAR TACHYCARDIA (HCC): ICD-10-CM

## 2023-09-06 DIAGNOSIS — E03.9 ACQUIRED HYPOTHYROIDISM: ICD-10-CM

## 2023-09-06 DIAGNOSIS — R53.83 OTHER FATIGUE: ICD-10-CM

## 2023-09-06 DIAGNOSIS — R00.0 TACHYCARDIA: ICD-10-CM

## 2023-09-06 NOTE — RESULT ENCOUNTER NOTE
Please notify patient labs within normal limits  continue current treatment    Future Appointments  3/1/2024   9:30 AM    Lenora Collado MD     fp sc [facility]

## 2023-10-20 DIAGNOSIS — E03.9 ACQUIRED HYPOTHYROIDISM: ICD-10-CM

## 2023-10-20 RX ORDER — LEVOTHYROXINE SODIUM 0.03 MG/1
TABLET ORAL
Qty: 12 TABLET | Refills: 3 | Status: SHIPPED | OUTPATIENT
Start: 2023-10-20

## 2023-10-20 NOTE — TELEPHONE ENCOUNTER
Please Approve or Refuse.   Send to Pharmacy per Pt's Request:      Next Visit Date:  3/1/2024   Last Visit Date: 8/30/2023    Hemoglobin A1C (%)   Date Value   10/29/2021 5.4   10/12/2018 5.2             ( goal A1C is < 7)   BP Readings from Last 3 Encounters:   08/30/23 124/86   02/17/23 120/80   08/16/22 114/82          (goal 120/80)  BUN   Date Value Ref Range Status   09/01/2023 9 mg/dL Final     Creatinine   Date Value Ref Range Status   09/01/2023 0.75  Final     Potassium   Date Value Ref Range Status   09/01/2023 3.8 mmol/L Final

## 2024-01-11 DIAGNOSIS — E03.9 ACQUIRED HYPOTHYROIDISM: ICD-10-CM

## 2024-01-11 RX ORDER — LEVOTHYROXINE SODIUM 88 UG/1
TABLET ORAL
Qty: 90 TABLET | Refills: 3 | Status: SHIPPED | OUTPATIENT
Start: 2024-01-11

## 2024-01-11 NOTE — TELEPHONE ENCOUNTER
Please Approve or Refuse.  Send to Pharmacy per Pt's Request: [facility]     Next Visit Date:  3/1/2024   Last Visit Date: 8/30/2023    Hemoglobin A1C (%)   Date Value   10/29/2021 5.4   10/12/2018 5.2             ( goal A1C is < 7)   BP Readings from Last 3 Encounters:   08/30/23 124/86   02/17/23 120/80   08/16/22 114/82          (goal 120/80)  BUN   Date Value Ref Range Status   09/01/2023 9 mg/dL Final     Creatinine   Date Value Ref Range Status   09/01/2023 0.75  Final     Potassium   Date Value Ref Range Status   09/01/2023 3.8 mmol/L Final

## 2024-02-19 ASSESSMENT — PATIENT HEALTH QUESTIONNAIRE - PHQ9
2. FEELING DOWN, DEPRESSED OR HOPELESS: NOT AT ALL
SUM OF ALL RESPONSES TO PHQ9 QUESTIONS 1 & 2: 0
SUM OF ALL RESPONSES TO PHQ QUESTIONS 1-9: 0
SUM OF ALL RESPONSES TO PHQ QUESTIONS 1-9: 0
1. LITTLE INTEREST OR PLEASURE IN DOING THINGS: 0
1. LITTLE INTEREST OR PLEASURE IN DOING THINGS: NOT AT ALL
SUM OF ALL RESPONSES TO PHQ QUESTIONS 1-9: 0
2. FEELING DOWN, DEPRESSED OR HOPELESS: 0
SUM OF ALL RESPONSES TO PHQ9 QUESTIONS 1 & 2: 0
SUM OF ALL RESPONSES TO PHQ QUESTIONS 1-9: 0

## 2024-02-21 ENCOUNTER — TELEPHONE (OUTPATIENT)
Dept: FAMILY MEDICINE CLINIC | Age: 34
End: 2024-02-21

## 2024-02-22 ENCOUNTER — TELEMEDICINE (OUTPATIENT)
Dept: FAMILY MEDICINE CLINIC | Age: 34
End: 2024-02-22
Payer: COMMERCIAL

## 2024-02-22 DIAGNOSIS — Z13.1 SCREENING FOR DIABETES MELLITUS: ICD-10-CM

## 2024-02-22 DIAGNOSIS — E53.8 B12 DEFICIENCY: ICD-10-CM

## 2024-02-22 DIAGNOSIS — R53.83 OTHER FATIGUE: ICD-10-CM

## 2024-02-22 DIAGNOSIS — F41.9 ANXIETY: ICD-10-CM

## 2024-02-22 DIAGNOSIS — E03.9 ACQUIRED HYPOTHYROIDISM: Primary | ICD-10-CM

## 2024-02-22 DIAGNOSIS — E04.1 THYROID NODULE: ICD-10-CM

## 2024-02-22 PROCEDURE — 99421 OL DIG E/M SVC 5-10 MIN: CPT | Performed by: NURSE PRACTITIONER

## 2024-02-22 RX ORDER — DULOXETIN HYDROCHLORIDE 30 MG/1
30 CAPSULE, DELAYED RELEASE ORAL DAILY
Qty: 30 CAPSULE | Refills: 2 | Status: SHIPPED | OUTPATIENT
Start: 2024-02-22 | End: 2024-05-22

## 2024-02-22 ASSESSMENT — ENCOUNTER SYMPTOMS
BACK PAIN: 0
CONSTIPATION: 0
DIARRHEA: 0
WHEEZING: 0
BLOOD IN STOOL: 0
VOMITING: 0
NAUSEA: 0
ABDOMINAL PAIN: 0
COUGH: 0
ABDOMINAL DISTENTION: 0
CHEST TIGHTNESS: 0
SHORTNESS OF BREATH: 0

## 2024-02-22 NOTE — PROGRESS NOTES
2024    TELEHEALTH EVALUATION -- Audio/Visual     Emilie Villarreal (:  1990) is a 33 y.o. female,Established patient, here for evaluation of the following chief complaint(s): Hypothyroidism and 6 Month Follow-Up    Patient is here for 6-month follow-up.  No major concerns today.  She has been be due for some lab work, she is also going to be due for her yearly thyroid ultrasound.  Will place orders for this.  Once again no major concerns today, she states overall she is doing well.  Additional 6-month lab work ordered as well.  She agrees with this.  Recommend 6-month follow-up.    ASSESSMENT/PLAN:    1. Acquired hypothyroidism  -Unsure if well-controlled  -Morning most recent lab work well-controlled  -Lab work ordered  -     T4, Free; Future  -     TSH; Future  2. B12 deficiency  -Unsure if improved or not  -Lab work ordered  -     Vitamin B12 & Folate; Future  3. Other fatigue  -Relatively unchanged  -Lab work ordered  -     CBC; Future  -     Comprehensive Metabolic Panel; Future  -     Magnesium; Future  -     T4, Free; Future  -     TSH; Future  -     Vitamin D 25 Hydroxy; Future  -     Vitamin B12 & Folate; Future  4. Thyroid nodule  -Yearly thyroid ultrasound ordered  -Patient denies any changes such as trouble swallowing or voice changes  -     US THYROID; Future  5. Screening for diabetes mellitus  -     Hemoglobin A1C; Future  6. Anxiety  -Well-controlled  -No change in current regimen  -     DULoxetine (CYMBALTA) 30 MG extended release capsule; Take 1 capsule by mouth daily, Disp-30 capsule, R-2Normal      Emilie received counseling on the following healthy behaviors: nutrition and medication adherence  Reviewed prior labs and health maintenance  Discussed use, benefit, and side effects of prescribed medications.   Barriers to medication compliance addressed.   Patient given educational materials - see patient instructions  All patient questions answered.  Patient voiced

## 2024-02-27 LAB
ALBUMIN SERPL-MCNC: 4.3 G/DL
ALP BLD-CCNC: 36 U/L
ALT SERPL-CCNC: 15 U/L
ANION GAP SERPL CALCULATED.3IONS-SCNC: 7 MMOL/L
AST SERPL-CCNC: 20 U/L
BASOPHILS ABSOLUTE: 0 /ΜL
BASOPHILS RELATIVE PERCENT: 0 %
BILIRUB SERPL-MCNC: 0.7 MG/DL (ref 0.1–1.4)
BUN BLDV-MCNC: 9 MG/DL
CALCIUM SERPL-MCNC: 8.9 MG/DL
CHLORIDE BLD-SCNC: 105 MMOL/L
CO2: 27 MMOL/L
CREAT SERPL-MCNC: 0.7 MG/DL
EGFR: >90
EOSINOPHILS ABSOLUTE: 0 /ΜL
EOSINOPHILS RELATIVE PERCENT: 0 %
ESTIMATED AVERAGE GLUCOSE: 105
FOLATE: 14.8
GLUCOSE BLD-MCNC: 85 MG/DL
HBA1C MFR BLD: 5.3 %
HCT VFR BLD CALC: 38.2 % (ref 36–46)
HEMOGLOBIN: 12.7 G/DL (ref 12–16)
LYMPHOCYTES ABSOLUTE: 0 /ΜL
LYMPHOCYTES RELATIVE PERCENT: 0 %
MAGNESIUM: 1.9 MG/DL
MCH RBC QN AUTO: 28.5 PG
MCHC RBC AUTO-ENTMCNC: 33.3 G/DL
MCV RBC AUTO: 86 FL
MONOCYTES ABSOLUTE: 0 /ΜL
MONOCYTES RELATIVE PERCENT: 0 %
NEUTROPHILS ABSOLUTE: 0 /ΜL
NEUTROPHILS RELATIVE PERCENT: 0 %
PLATELET # BLD: 208 K/ΜL
PMV BLD AUTO: 8 FL
POTASSIUM SERPL-SCNC: 3.9 MMOL/L
RBC # BLD: 4.46 10^6/ΜL
SODIUM BLD-SCNC: 139 MMOL/L
T4 FREE: 1.1
TOTAL PROTEIN: 6.9
TSH SERPL DL<=0.05 MIU/L-ACNC: 2.93 UIU/ML
VITAMIN B-12: 490
VITAMIN D 25-HYDROXY: 36.4
VITAMIN D2, 25 HYDROXY: NORMAL
VITAMIN D3,25 HYDROXY: NORMAL
WBC # BLD: 4.5 10^3/ML

## 2024-02-28 DIAGNOSIS — Z13.1 SCREENING FOR DIABETES MELLITUS: ICD-10-CM

## 2024-02-28 DIAGNOSIS — E53.8 B12 DEFICIENCY: ICD-10-CM

## 2024-02-28 DIAGNOSIS — R53.83 OTHER FATIGUE: ICD-10-CM

## 2024-02-28 DIAGNOSIS — E03.9 ACQUIRED HYPOTHYROIDISM: ICD-10-CM

## 2024-02-29 ENCOUNTER — HOSPITAL ENCOUNTER (OUTPATIENT)
Dept: ULTRASOUND IMAGING | Age: 34
Discharge: HOME | End: 2024-03-02
Payer: COMMERCIAL

## 2024-02-29 DIAGNOSIS — E04.1 THYROID NODULE: ICD-10-CM

## 2024-02-29 PROCEDURE — 76536 US EXAM OF HEAD AND NECK: CPT

## 2024-06-18 DIAGNOSIS — F41.9 ANXIETY: ICD-10-CM

## 2024-06-18 RX ORDER — DULOXETIN HYDROCHLORIDE 30 MG/1
30 CAPSULE, DELAYED RELEASE ORAL DAILY
Qty: 90 CAPSULE | Refills: 3 | Status: SHIPPED | OUTPATIENT
Start: 2024-06-18

## 2024-06-18 NOTE — TELEPHONE ENCOUNTER
Please Approve or Refuse.  Send to Pharmacy per Pt's Request: [email or web address]     Next Visit Date:  9/5/2024   Last Visit Date: 2/22/2024    Hemoglobin A1C (%)   Date Value   02/27/2024 5.3   10/29/2021 5.4   10/12/2018 5.2             ( goal A1C is < 7)   BP Readings from Last 3 Encounters:   08/30/23 124/86   02/17/23 120/80   08/16/22 114/82          (goal 120/80)  BUN   Date Value Ref Range Status   02/27/2024 9 mg/dL Final     Creatinine   Date Value Ref Range Status   02/27/2024 0.70  Final     Potassium   Date Value Ref Range Status   02/27/2024 3.9 mmol/L Final

## 2024-08-06 DIAGNOSIS — E03.9 ACQUIRED HYPOTHYROIDISM: ICD-10-CM

## 2024-08-06 NOTE — TELEPHONE ENCOUNTER
Please Approve or Refuse.  Send to Pharmacy per Pt's Request:      Next Visit Date:  9/5/2024   Last Visit Date: 2/22/2024    Hemoglobin A1C (%)   Date Value   02/27/2024 5.3   10/29/2021 5.4   10/12/2018 5.2             ( goal A1C is < 7)   BP Readings from Last 3 Encounters:   08/30/23 124/86   02/17/23 120/80   08/16/22 114/82          (goal 120/80)  BUN   Date Value Ref Range Status   02/27/2024 9 mg/dL Final     Creatinine   Date Value Ref Range Status   02/27/2024 0.70  Final     Potassium   Date Value Ref Range Status   02/27/2024 3.9 mmol/L Final

## 2024-08-07 RX ORDER — LEVOTHYROXINE SODIUM 88 UG/1
88 TABLET ORAL DAILY
Qty: 90 TABLET | Refills: 3 | Status: SHIPPED | OUTPATIENT
Start: 2024-08-07

## 2024-08-07 RX ORDER — LEVOTHYROXINE SODIUM 0.03 MG/1
TABLET ORAL
Qty: 12 TABLET | Refills: 3 | Status: SHIPPED | OUTPATIENT
Start: 2024-08-07

## 2024-08-22 ENCOUNTER — OFFICE VISIT (OUTPATIENT)
Dept: FAMILY MEDICINE CLINIC | Age: 34
End: 2024-08-22
Payer: COMMERCIAL

## 2024-08-22 VITALS
WEIGHT: 118.4 LBS | HEART RATE: 107 BPM | OXYGEN SATURATION: 97 % | BODY MASS INDEX: 23.25 KG/M2 | HEIGHT: 60 IN | SYSTOLIC BLOOD PRESSURE: 122 MMHG | DIASTOLIC BLOOD PRESSURE: 80 MMHG

## 2024-08-22 DIAGNOSIS — R10.11 RUQ PAIN: Primary | ICD-10-CM

## 2024-08-22 PROCEDURE — 99213 OFFICE O/P EST LOW 20 MIN: CPT | Performed by: NURSE PRACTITIONER

## 2024-08-22 RX ORDER — DEUCRAVACITINIB 6 MG/1
TABLET, FILM COATED ORAL
COMMUNITY
Start: 2024-05-15

## 2024-08-22 SDOH — ECONOMIC STABILITY: FOOD INSECURITY: WITHIN THE PAST 12 MONTHS, YOU WORRIED THAT YOUR FOOD WOULD RUN OUT BEFORE YOU GOT MONEY TO BUY MORE.: NEVER TRUE

## 2024-08-22 SDOH — ECONOMIC STABILITY: FOOD INSECURITY: WITHIN THE PAST 12 MONTHS, THE FOOD YOU BOUGHT JUST DIDN'T LAST AND YOU DIDN'T HAVE MONEY TO GET MORE.: NEVER TRUE

## 2024-08-22 SDOH — ECONOMIC STABILITY: INCOME INSECURITY: HOW HARD IS IT FOR YOU TO PAY FOR THE VERY BASICS LIKE FOOD, HOUSING, MEDICAL CARE, AND HEATING?: NOT HARD AT ALL

## 2024-08-22 ASSESSMENT — PATIENT HEALTH QUESTIONNAIRE - PHQ9
SUM OF ALL RESPONSES TO PHQ QUESTIONS 1-9: 0
2. FEELING DOWN, DEPRESSED OR HOPELESS: NOT AT ALL
SUM OF ALL RESPONSES TO PHQ QUESTIONS 1-9: 0
1. LITTLE INTEREST OR PLEASURE IN DOING THINGS: NOT AT ALL
SUM OF ALL RESPONSES TO PHQ QUESTIONS 1-9: 0
SUM OF ALL RESPONSES TO PHQ QUESTIONS 1-9: 0
SUM OF ALL RESPONSES TO PHQ9 QUESTIONS 1 & 2: 0

## 2024-08-22 ASSESSMENT — ENCOUNTER SYMPTOMS
VOMITING: 0
COUGH: 0
ABDOMINAL DISTENTION: 0
NAUSEA: 0
WHEEZING: 0
CONSTIPATION: 0
ABDOMINAL PAIN: 1
DIARRHEA: 0
BLOOD IN STOOL: 0
CHEST TIGHTNESS: 0
BACK PAIN: 0
SHORTNESS OF BREATH: 0

## 2024-08-22 NOTE — PROGRESS NOTES
Visit Information    Have you changed or started any medications since your last visit including any over-the-counter medicines, vitamins, or herbal medicines? no   Have you stopped taking any of your medications? Is so, why? -  no  Are you having any side effects from any of your medications? - no    Have you seen any other physician or provider since your last visit?  no   Have you had any other diagnostic tests since your last visit?  no   Have you been seen in the emergency room and/or had an admission in a hospital since we last saw you?  no   Have you had your routine dental cleaning in the past 6 months?  no     Do you have an active MyChart account? If no, what is the barrier?  Yes    Patient Care Team:  Silvia Lane MD as PCP - General (Family Medicine)  Silvia Lane MD as PCP - Empaneled Provider  Kishan Echols MD as Surgeon (Cardiology)  Mariusz Cross MD Gottwald, Lorie D, MD (Dermatology)  Ady Marie MD as Consulting Physician (Otolaryngology)    Medical History Review  Past Medical, Family, and Social History reviewed and does contribute to the patient presenting condition    Health Maintenance   Topic Date Due    Flu vaccine (1) 08/01/2024    Cervical cancer screen  08/30/2024 (Originally 8/29/2018)    Depression Screen  02/19/2025    DTaP/Tdap/Td vaccine (5 - Td or Tdap) 08/16/2032    Hepatitis B vaccine  Completed    Meningococcal (ACWY) vaccine  Completed    COVID-19 Vaccine  Completed    Hepatitis C screen  Completed    HIV screen  Completed    Hepatitis A vaccine  Aged Out    Hib vaccine  Aged Out    HPV vaccine  Aged Out    Polio vaccine  Aged Out    Pneumococcal 0-64 years Vaccine  Aged Out    Varicella vaccine  Discontinued               
electronic signature was used to authenticate this note.  Electronically signed by NICOLA Love CNP on 8/22/2024 at 1:06 PM

## 2024-08-29 ENCOUNTER — HOSPITAL ENCOUNTER (OUTPATIENT)
Dept: ULTRASOUND IMAGING | Age: 34
Discharge: HOME | End: 2024-08-31
Payer: COMMERCIAL

## 2024-08-29 DIAGNOSIS — R10.11 RUQ PAIN: ICD-10-CM

## 2024-08-29 PROCEDURE — 76705 ECHO EXAM OF ABDOMEN: CPT

## 2024-09-04 PROBLEM — L81.4 OTHER MELANIN HYPERPIGMENTATION: Status: ACTIVE | Noted: 2024-07-12

## 2024-09-05 ENCOUNTER — OFFICE VISIT (OUTPATIENT)
Dept: FAMILY MEDICINE CLINIC | Age: 34
End: 2024-09-05

## 2024-09-05 ENCOUNTER — TELEPHONE (OUTPATIENT)
Dept: GASTROENTEROLOGY | Age: 34
End: 2024-09-05

## 2024-09-05 VITALS
WEIGHT: 118.8 LBS | HEIGHT: 60 IN | DIASTOLIC BLOOD PRESSURE: 80 MMHG | TEMPERATURE: 98.6 F | BODY MASS INDEX: 23.32 KG/M2 | HEART RATE: 100 BPM | OXYGEN SATURATION: 99 % | SYSTOLIC BLOOD PRESSURE: 106 MMHG

## 2024-09-05 DIAGNOSIS — Z23 NEED FOR INFLUENZA VACCINATION: ICD-10-CM

## 2024-09-05 DIAGNOSIS — R19.8: ICD-10-CM

## 2024-09-05 DIAGNOSIS — Z00.01 ENCOUNTER FOR WELL ADULT EXAM WITH ABNORMAL FINDINGS: Primary | ICD-10-CM

## 2024-09-05 DIAGNOSIS — E03.9 ACQUIRED HYPOTHYROIDISM: ICD-10-CM

## 2024-09-05 DIAGNOSIS — R53.82 CHRONIC FATIGUE: ICD-10-CM

## 2024-09-05 PROBLEM — L66.9 CICATRICIAL ALOPECIA: Status: ACTIVE | Noted: 2021-10-25

## 2024-09-05 PROBLEM — I47.29 NONSUSTAINED VENTRICULAR TACHYCARDIA (HCC): Status: RESOLVED | Noted: 2023-08-30 | Resolved: 2024-09-05

## 2024-09-05 PROBLEM — N83.209 HEMORRHAGIC OVARIAN CYST: Status: RESOLVED | Noted: 2019-05-30 | Resolved: 2024-09-05

## 2024-09-05 SDOH — ECONOMIC STABILITY: FOOD INSECURITY: WITHIN THE PAST 12 MONTHS, THE FOOD YOU BOUGHT JUST DIDN'T LAST AND YOU DIDN'T HAVE MONEY TO GET MORE.: NEVER TRUE

## 2024-09-05 SDOH — ECONOMIC STABILITY: FOOD INSECURITY: WITHIN THE PAST 12 MONTHS, YOU WORRIED THAT YOUR FOOD WOULD RUN OUT BEFORE YOU GOT MONEY TO BUY MORE.: NEVER TRUE

## 2024-09-05 SDOH — ECONOMIC STABILITY: INCOME INSECURITY: HOW HARD IS IT FOR YOU TO PAY FOR THE VERY BASICS LIKE FOOD, HOUSING, MEDICAL CARE, AND HEATING?: NOT HARD AT ALL

## 2024-09-05 ASSESSMENT — PATIENT HEALTH QUESTIONNAIRE - PHQ9
SUM OF ALL RESPONSES TO PHQ QUESTIONS 1-9: 0
SUM OF ALL RESPONSES TO PHQ9 QUESTIONS 1 & 2: 0
SUM OF ALL RESPONSES TO PHQ QUESTIONS 1-9: 0
2. FEELING DOWN, DEPRESSED OR HOPELESS: NOT AT ALL
1. LITTLE INTEREST OR PLEASURE IN DOING THINGS: NOT AT ALL
SUM OF ALL RESPONSES TO PHQ QUESTIONS 1-9: 0
SUM OF ALL RESPONSES TO PHQ QUESTIONS 1-9: 0

## 2024-09-05 ASSESSMENT — ENCOUNTER SYMPTOMS
DIARRHEA: 0
CHEST TIGHTNESS: 0
BACK PAIN: 0
WHEEZING: 0
SHORTNESS OF BREATH: 0
ABDOMINAL PAIN: 0
TROUBLE SWALLOWING: 0
NAUSEA: 0
VOMITING: 0
CONSTIPATION: 0
BLOOD IN STOOL: 0
COUGH: 0
ABDOMINAL DISTENTION: 0

## 2024-09-05 ASSESSMENT — VISUAL ACUITY
OS_CC: 20/20
OD_CC: 20/30

## 2024-09-05 NOTE — TELEPHONE ENCOUNTER
Writer called and left message for patient to call and make a new patient appointment with dr valenzuela. From her referral.

## 2024-09-05 NOTE — PROGRESS NOTES
Visit Information    Have you changed or started any medications since your last visit including any over-the-counter medicines, vitamins, or herbal medicines? no   Have you stopped taking any of your medications? Is so, why? -  no  Are you having any side effects from any of your medications? - no    Have you seen any other physician or provider since your last visit?  no    Have you had any other diagnostic tests since your last visit?  yes -    Have you been seen in the emergency room and/or had an admission in a hospital since we last saw you?  no   Have you had your routine dental cleaning in the past 6 months?  yes -      Do you have an active MyChart account? If no, what is the barrier?  Yes    Patient Care Team:  Silvia Lane MD as PCP - General (Family Medicine)  Silvia Lane MD as PCP - Empaneled Provider  Kishan Echols MD as Surgeon (Cardiology)  Mariusz Cross MD Gottwald, Lorie D, MD (Dermatology)  Ady Marie MD as Consulting Physician (Otolaryngology)    Medical History Review  Past Medical, Family, and Social History reviewed and does contribute to the patient presenting condition    Health Maintenance   Topic Date Due    Cervical cancer screen  Never done    Flu vaccine (1) 08/01/2024    Depression Screen  08/22/2025    DTaP/Tdap/Td vaccine (5 - Td or Tdap) 08/16/2032    Hepatitis B vaccine  Completed    Meningococcal (ACWY) vaccine  Completed    COVID-19 Vaccine  Completed    Hepatitis C screen  Completed    HIV screen  Completed    Hepatitis A vaccine  Aged Out    Hib vaccine  Aged Out    HPV vaccine  Aged Out    Polio vaccine  Aged Out    Pneumococcal 0-64 years Vaccine  Aged Out    Varicella vaccine  Discontinued

## 2024-09-05 NOTE — PROGRESS NOTES
2024      Emilie Villarreal (:  1990) is a 34 y.o. female, here for a preventive medicine evaluation, Annual Exam, Fatigue, Thyroid Problem, and Results   .      ASSESSMENT/PLAN:    1. Encounter for well adult exam with abnormal findings    Low carb, low fat diet, increase fruits and vegetables, and exercise 4-5 times a week 30-40 minutes a day, or walk 1-2 hours per day, or wear a pedometer and get at least 10,000 steps per day.    Dental exam 2-3 times /year advised.  Immunizations reviewed.    Health Maintenance reviewed   Smoking cessation counseling given, not to ever start smoking    Annual eye exam advised.  She was never sexually active, she would not qualify to do a Pap smear at this time    2. Chronic fatigue  Ongoing  Will do basic labs to rule out certain common medical conditions: hematologic, renal, hepatic, electrolyte imbalances, thyroid disorders.    -     SC OFFICE/OUTPATIENT ESTABLISHED MOD MDM 30-39 MIN  -     Hepatic Function Panel; Future  -     CBC with Auto Differential; Future  -     Basic Metabolic Panel; Future  3. Acquired hypothyroidism  Improved with medication  Continue current treatment, levothyroxine 88 Mg daily, and additional 25 mcg on Sundays  Advised to take Synthroid in the morning, on empty stomach, without any other meds and with a full glass of water.  She did have biopsy of the thyroid nodule 3/9/2023, report in results, nonmalignant  She was evaluated by ENT    -     SC OFFICE/OUTPATIENT ESTABLISHED MOD MDM 30-39 MIN  -     TSH; Future  -     T4, Free; Future  4. Nonspecific hepatocellular changes  New findings on the ultrasound, discussed with patient, coarse heterogenous echotexture of the liver, no focal lesions, no stones in the gallbladder.  This is the first time such a reading of the liver ultrasound has been obtained, I reviewed prior records from [facility] and liver ultrasound or CT was normal before  We will do additional testing for nonspecific

## 2024-09-11 LAB
A/G RATIO: NORMAL
ALBUMIN: 4.3 G/DL
ALP BLD-CCNC: 38 U/L
ALT SERPL-CCNC: 11 U/L
AST SERPL-CCNC: 18 U/L
BASOPHILS ABSOLUTE: 0 /ΜL
BASOPHILS RELATIVE PERCENT: 0.2 %
BILIRUB SERPL-MCNC: 0.2 MG/DL (ref 0.1–1.4)
BILIRUBIN DIRECT: 0.2 MG/DL
BILIRUBIN, INDIRECT: NORMAL
BUN BLDV-MCNC: 8 MG/DL
CALCIUM SERPL-MCNC: 9.4 MG/DL
CERULOPLASMIN: 24
CHLORIDE BLD-SCNC: 105 MMOL/L
CO2: 29 MMOL/L
CREAT SERPL-MCNC: 0.67 MG/DL
EGFR: NORMAL
EOSINOPHILS ABSOLUTE: 0.1 /ΜL
EOSINOPHILS RELATIVE PERCENT: 1.2 %
FERRITIN: 8 NG/ML (ref 9–150)
GLOBULIN: NORMAL
GLUCOSE BLD-MCNC: 90 MG/DL
HCT VFR BLD CALC: 39 % (ref 36–46)
HEMOGLOBIN: 12.5 G/DL (ref 12–16)
IRON: NORMAL
LYMPHOCYTES ABSOLUTE: 0.7 /ΜL
LYMPHOCYTES RELATIVE PERCENT: 14.6 %
MCH RBC QN AUTO: 26.8 PG
MCHC RBC AUTO-ENTMCNC: 32.2 G/DL
MCV RBC AUTO: 83 FL
MONOCYTES ABSOLUTE: 0.6 /ΜL
MONOCYTES RELATIVE PERCENT: 11.1 %
NEUTROPHILS ABSOLUTE: 3.7 /ΜL
NEUTROPHILS RELATIVE PERCENT: 72.9 %
PDW BLD-RTO: 14.3 %
PLATELET # BLD: 237 K/ΜL
PMV BLD AUTO: 8.2 FL
POTASSIUM SERPL-SCNC: 4 MMOL/L
RBC # BLD: 4.69 10^6/ΜL
SODIUM BLD-SCNC: 142 MMOL/L
T4 FREE: 1.14
TOTAL IRON BINDING CAPACITY: NORMAL
TOTAL PROTEIN: 7.1 G/DL (ref 6.4–8.2)
TSH SERPL DL<=0.05 MIU/L-ACNC: 4.36 UIU/ML
WBC # BLD: 5.1 10^3/ML

## 2024-09-12 DIAGNOSIS — E03.9 ACQUIRED HYPOTHYROIDISM: ICD-10-CM

## 2024-09-12 DIAGNOSIS — R53.82 CHRONIC FATIGUE: ICD-10-CM

## 2024-09-12 DIAGNOSIS — R19.8: ICD-10-CM

## 2024-09-13 DIAGNOSIS — R19.8: ICD-10-CM

## 2024-09-18 DIAGNOSIS — R19.8: ICD-10-CM

## 2024-09-30 DIAGNOSIS — F41.9 ANXIETY: ICD-10-CM

## 2024-09-30 RX ORDER — DULOXETIN HYDROCHLORIDE 30 MG/1
30 CAPSULE, DELAYED RELEASE ORAL DAILY
Qty: 90 CAPSULE | Refills: 3 | Status: SHIPPED | OUTPATIENT
Start: 2024-09-30

## 2024-09-30 NOTE — TELEPHONE ENCOUNTER
Please Approve or Refuse.  Send to Pharmacy per Pt's Request: [facility]      Next Visit Date:  called pt lvm   Last Visit Date: 9/5/2024    Hemoglobin A1C (%)   Date Value   02/27/2024 5.3   10/29/2021 5.4   10/12/2018 5.2             ( goal A1C is < 7)   BP Readings from Last 3 Encounters:   09/05/24 106/80   08/22/24 122/80   08/30/23 124/86          (goal 120/80)  BUN   Date Value Ref Range Status   09/11/2024 8 mg/dL Final     Creatinine   Date Value Ref Range Status   09/11/2024 0.67 mg/dL Final     Potassium   Date Value Ref Range Status   09/11/2024 4.0 mmol/L Final

## 2024-10-15 ENCOUNTER — OFFICE VISIT (OUTPATIENT)
Dept: GASTROENTEROLOGY | Age: 34
End: 2024-10-15
Payer: COMMERCIAL

## 2024-10-15 VITALS
WEIGHT: 117 LBS | BODY MASS INDEX: 22.97 KG/M2 | HEART RATE: 84 BPM | HEIGHT: 60 IN | DIASTOLIC BLOOD PRESSURE: 89 MMHG | SYSTOLIC BLOOD PRESSURE: 130 MMHG

## 2024-10-15 DIAGNOSIS — R93.2 ABNORMAL LIVER SCAN: Primary | ICD-10-CM

## 2024-10-15 PROCEDURE — 99204 OFFICE O/P NEW MOD 45 MIN: CPT | Performed by: INTERNAL MEDICINE

## 2024-10-15 RX ORDER — IRON,CARBONYL/ASCORBIC ACID 100-250 MG
TABLET ORAL
COMMUNITY
Start: 2024-09-22

## 2024-10-15 NOTE — PROGRESS NOTES
09/11/2024    BASOPCT 0.2 09/11/2024    LYMPHSABS 0.7 09/11/2024    MONOSABS 0.6 09/11/2024    NEUTROABS 3.7 09/11/2024    EOSABS 0.1 09/11/2024    BASOSABS 0.0 09/11/2024     No results found for: \"CERULT\", \"SMAB\", \"HEPATITIS\"      DIAGNOSTIC TESTING:     No results found.       IMPRESSION: Ms. Villarreal is a 34 y.o. female with abnormal liver US showing coarse echotexture. However, her LFT's and CBC is normal. Her liver work-up is negative for viral, auto-immune or hereditary liver disease.  Patient is otherwise asymptomatic     Plan- 1. Will do liver elastography  2. Continue current medications  No follow-ups on file.     Spent 30 minutes providing patient education and counseling.     Thank you for allowing me to participate in the care of Ms. Villarreal. For any further questions please do not hesitate to contact me.      I have reviewed and agree with the MA/LPN ROS.     Rosi Moody MD, [facility]  Office #: [phone]

## 2024-10-17 ENCOUNTER — HOSPITAL ENCOUNTER (OUTPATIENT)
Dept: ULTRASOUND IMAGING | Age: 34
Discharge: HOME | End: 2024-10-19
Payer: COMMERCIAL

## 2024-10-17 DIAGNOSIS — R93.2 ABNORMAL LIVER SCAN: ICD-10-CM

## 2024-10-17 PROCEDURE — 76981 USE PARENCHYMA: CPT

## 2024-12-18 ENCOUNTER — OFFICE VISIT (OUTPATIENT)
Dept: GASTROENTEROLOGY | Age: 34
End: 2024-12-18
Payer: COMMERCIAL

## 2024-12-18 VITALS
BODY MASS INDEX: 22.97 KG/M2 | DIASTOLIC BLOOD PRESSURE: 87 MMHG | HEIGHT: 60 IN | SYSTOLIC BLOOD PRESSURE: 124 MMHG | HEART RATE: 98 BPM | WEIGHT: 117 LBS

## 2024-12-18 DIAGNOSIS — R93.2 ABNORMAL LIVER SCAN: Primary | ICD-10-CM

## 2024-12-18 PROCEDURE — 99214 OFFICE O/P EST MOD 30 MIN: CPT | Performed by: INTERNAL MEDICINE

## 2024-12-18 NOTE — PROGRESS NOTES
from the biopsies done on the previous endoscopies    Patient's PMH/PSH,SH,PSYCH Hx, MEDs, ALLERGIES, and ROS were all reviewed and updated in the appropriate sections.    PAST MEDICAL HISTORY:  Past Medical History:   Diagnosis Date    Anxiety 10/25/2021    GERD (gastroesophageal reflux disease)     Hemorrhagic ovarian cyst 05/30/2019    History of ventricular tachycardia 10/01/2020    Hypothyroidism 2009    had thyroid radiation, was hyperthyroid, checked for celiac disease on 4/15/14 at [facility]    Neoplasm of uncertain behavior of skin 10/20/2020    Nonsustained ventricular tachycardia (HCC) 08/30/2023    RUQ abdominal pain 04/07/2015    Toxic multinodul goiter 2010    had iodine 131 therapy     Wound, open, scalp with complication, subsequent encounter 11/18/2014       Past Surgical History:   Procedure Laterality Date    COSMETIC SURGERY  [id number]    scalp wound    IR BIOPSY THYROID PERC CORE NEEDLE  3/9/2023    IR BIOPSY THYROID PERC CORE NEEDLE [facility] SPECIAL PROCEDURES    TONSILLECTOMY  1998    URETHROPLASTY  1998    ureathal reimplantation       CURRENT MEDICATIONS:    Current Outpatient Medications:     Iron-Vitamin C (IRON 100/C) 100-250 MG TABS, , Disp: , Rfl:     DULoxetine (CYMBALTA) 30 MG extended release capsule, Take 1 capsule by mouth daily, Disp: 90 capsule, Rfl: 3    Deucravacitinib (SOTYKTU) 6 MG TABS, , Disp: , Rfl:     levothyroxine (SYNTHROID) 25 MCG tablet, take 1 tablet by mouth EVERY 7 DAYS (ON SUNDAYS) TOGETHER WITH 88MCG TO = A TOTAL OF 113MCG ONLY ON SUNDAYS. ALL OTHER DAYS TAKE 88MCG, Disp: 12 tablet, Rfl: 3    levothyroxine (SYNTHROID) 88 MCG tablet, Take 1 tablet by mouth daily, Disp: 90 tablet, Rfl: 3    Magnesium Oxide (MAGNESIUM-OXIDE) 250 MG TABS tablet, Take 1 tablet by mouth Daily with supper, Disp: 90 tablet, Rfl: 0    ALLERGIES:   Allergies   Allergen Reactions    Peanut (Diagnostic)     Bactroban [Mupirocin Calcium] Rash    Sulfamethoxazole-Trimethoprim Rash

## 2025-01-03 ENCOUNTER — HOSPITAL ENCOUNTER (OUTPATIENT)
Dept: ULTRASOUND IMAGING | Age: 35
End: 2025-01-03
Payer: COMMERCIAL

## 2025-01-03 VITALS
BODY MASS INDEX: 22.38 KG/M2 | HEART RATE: 102 BPM | TEMPERATURE: 98.5 F | HEIGHT: 60 IN | WEIGHT: 114 LBS | OXYGEN SATURATION: 98 % | DIASTOLIC BLOOD PRESSURE: 75 MMHG | SYSTOLIC BLOOD PRESSURE: 112 MMHG | RESPIRATION RATE: 14 BRPM

## 2025-01-03 DIAGNOSIS — R93.2 ABNORMAL LIVER SCAN: ICD-10-CM

## 2025-01-03 LAB
INR PPP: 0.9
PLATELET # BLD AUTO: 214 K/UL (ref 138–453)
PROTHROMBIN TIME: 12.5 SEC (ref 11.5–14.2)

## 2025-01-03 PROCEDURE — 2580000003 HC RX 258: Performed by: RADIOLOGY

## 2025-01-03 PROCEDURE — 85610 PROTHROMBIN TIME: CPT

## 2025-01-03 PROCEDURE — 7100000010 HC PHASE II RECOVERY - FIRST 15 MIN

## 2025-01-03 PROCEDURE — 85049 AUTOMATED PLATELET COUNT: CPT

## 2025-01-03 PROCEDURE — 47000 NEEDLE BIOPSY OF LIVER PERQ: CPT

## 2025-01-03 PROCEDURE — 88307 TISSUE EXAM BY PATHOLOGIST: CPT

## 2025-01-03 PROCEDURE — 7100000011 HC PHASE II RECOVERY - ADDTL 15 MIN

## 2025-01-03 PROCEDURE — 88313 SPECIAL STAINS GROUP 2: CPT

## 2025-01-03 PROCEDURE — 76942 ECHO GUIDE FOR BIOPSY: CPT

## 2025-01-03 RX ORDER — SODIUM CHLORIDE 9 MG/ML
INJECTION, SOLUTION INTRAVENOUS CONTINUOUS
Status: DISPENSED | OUTPATIENT
Start: 2025-01-03

## 2025-01-03 RX ADMIN — SODIUM CHLORIDE: 9 INJECTION, SOLUTION INTRAVENOUS at 10:41

## 2025-01-03 ASSESSMENT — PAIN SCALES - GENERAL: PAINLEVEL_OUTOF10: 0

## 2025-01-03 ASSESSMENT — PAIN - FUNCTIONAL ASSESSMENT
PAIN_FUNCTIONAL_ASSESSMENT: 0-10
PAIN_FUNCTIONAL_ASSESSMENT: NONE - DENIES PAIN

## 2025-01-03 NOTE — FLOWSHEET NOTE
Patient here for a liver biopsy, procedure completed patient tolerated well vitals stable and charted patient to PACU in NAD. There for approx 2 hours.

## 2025-01-03 NOTE — H&P
History and Physical GI Update    Pt Name: Emilie Villarreal  MRN: [id number]  YOB: 1990  Date of evaluation: 1/3/2025      [x] I have reviewed the GASTROENTEROLOGY PROGRESS   NOTE OF 12 /18/2024 BY    found in [facility]  which meets the criteria for an Interval History and Physical note and is attached below.    [x] I have examined  Emilie Villarreal and there are no changes to the patient or plans for a LIVER BIOPSY .by Dr GRULLON  for EVALUATION OF ABNORMAL LIVER U/S.THE PATIENT COMPLAINS OF INTERMITTENT RIGHT UPPER QUADRANT PAIN..      Patient today denies  fever, chills, night sweats, pain or unexplained weight loss. SHE DOES NOT TAKE BLOOD THINNERS, SHE DOES NOT HAVE DIABETES.    Vital signs: BP (!) 143/99   Pulse 93   Temp 97.5 °F (36.4 °C)   Resp 14   Ht 1.524 m (5')   Wt 51.7 kg (114 lb)   LMP 12/24/2024   SpO2 96%   BMI 22.26 kg/m²     Allergies:  Peanut (diagnostic), Bactroban [mupirocin calcium], and Sulfamethoxazole-trimethoprim    Medications:   Current Outpatient Medications on File Prior to Encounter   Medication Sig Dispense Refill    Iron-Vitamin C (IRON 100/C) 100-250 MG TABS       DULoxetine (CYMBALTA) 30 MG extended release capsule Take 1 capsule by mouth daily 90 capsule 3    Deucravacitinib (SOTYKTU) 6 MG TABS       levothyroxine (SYNTHROID) 88 MCG tablet Take 1 tablet by mouth daily 90 tablet 3    Magnesium Oxide (MAGNESIUM-OXIDE) 250 MG TABS tablet Take 1 tablet by mouth Daily with supper 90 tablet 0     No current facility-administered medications on file prior to encounter.            Prior to Admission medications    Medication Sig Start Date End Date Taking? Authorizing Provider   Iron-Vitamin C (IRON 100/C) 100-250 MG TABS  9/22/24  Yes Peter Tee MD   DULoxetine (CYMBALTA) 30 MG extended release capsule Take 1 capsule by mouth daily 9/30/24  Yes Silvia Lane MD   Deucravacitinib (SOTYKTU) 6 MG TABS  5/15/24  Yes Peter Tee MD    levothyroxine (SYNTHROID) 88 MCG tablet Take 1 tablet by mouth daily 8/7/24  Yes Ck Valencia MD   Magnesium Oxide (MAGNESIUM-OXIDE) 250 MG TABS tablet Take 1 tablet by mouth Daily with supper 8/30/23  Yes Silvia Lane MD       This is a 34 y.o. female who is  pleasant, cooperative, alert and oriented x3, in no acute distress.    Heart: Heart sounds are normal.  Regular rate and rhythm without murmur, gallop or rub.   Lungs:clear to auscultation without wheezes or rales    Abdomen: soft, nontender, nondistended, .  bowel sounds present .   PEDAL PULSES + 2 BILATERALLY NO CALF TENDERNESS NO EDEMA    Labs:  No results for input(s): \"HGB\", \"HCT\", \"WBC\", \"MCV\", \"PLATELET\", \"NA\", \"K\", \"CL\", \"CO2\", \"BUN\", \"CREATININE\", \"GLUCOSE\", \"INR\", \"PROTIME\", \"APTT\", \"AST\", \"ALT\", \"LABALBU\", \"HCG\" in the last 720 hours.        GI CLINIC FOLLOW UP    INTERVAL HISTORY:   No referring provider defined for this encounter.    No chief complaint on file.          HISTORY OF PRESENT ILLNESS: Ms.Stacy LIBORIO Villarreal is a 34 y.o. female , referred for evaluation of abnormal liver US.    She was initially seen for c/o- pain RUQ abdomen which began in June 2024 and lasted 1 month. She does not have any pain now. She had liver US done by her PCP which showed- Coarse heterogeneous echotexture of the liver.  No focal lesion. Hepatopetal flow portal vein.  Liver 13.4 cm in length.     However, her LFT's are normal and CBC is normal.  She drinks socially and never has been a heavy drinker in the past.   Her viral hepatitis panel is negative. Auto-immune panel and  ceruloplasmin is normal.  No F/H of liver disease.    We did liver elastography which showed- LIVER:  Visualized portions with normal echogenicity, coarsened echotexture, and no obvious surface nodularity.  No included focal lesions.   Median stiffness:  4.11 kPa   IQR:median ratio (IQR/M, results unreliable if >0.30):  0.15  MPRESSION:  1. Limited visualization of the liver to

## 2025-01-08 LAB — SURGICAL PATHOLOGY REPORT: NORMAL

## 2025-01-28 DIAGNOSIS — E03.9 ACQUIRED HYPOTHYROIDISM: ICD-10-CM

## 2025-01-28 RX ORDER — LEVOTHYROXINE SODIUM 25 UG/1
TABLET ORAL
Qty: 12 TABLET | Refills: 3 | Status: SHIPPED | OUTPATIENT
Start: 2025-01-28

## 2025-01-28 NOTE — TELEPHONE ENCOUNTER
Please Approve or Refuse.  Send to Pharmacy per Pt's Request:      Next Visit Date: 09/05/2025  Last Visit Date: 9/5/2024    Hemoglobin A1C (%)   Date Value   02/27/2024 5.3   10/29/2021 5.4   10/12/2018 5.2             ( goal A1C is < 7)   BP Readings from Last 3 Encounters:   01/03/25 112/75   12/18/24 124/87   10/15/24 130/89          (goal 120/80)  BUN   Date Value Ref Range Status   09/11/2024 8 mg/dL Final     Creatinine   Date Value Ref Range Status   09/11/2024 0.67 mg/dL Final     Potassium   Date Value Ref Range Status   09/11/2024 4.0 mmol/L Final

## 2025-03-18 ENCOUNTER — OFFICE VISIT (OUTPATIENT)
Dept: GASTROENTEROLOGY | Age: 35
End: 2025-03-18
Payer: COMMERCIAL

## 2025-03-18 VITALS
HEART RATE: 95 BPM | WEIGHT: 116 LBS | HEIGHT: 60 IN | DIASTOLIC BLOOD PRESSURE: 87 MMHG | SYSTOLIC BLOOD PRESSURE: 126 MMHG | BODY MASS INDEX: 22.78 KG/M2

## 2025-03-18 DIAGNOSIS — R93.2 ABNORMAL LIVER SCAN: Primary | ICD-10-CM

## 2025-03-18 PROCEDURE — 99213 OFFICE O/P EST LOW 20 MIN: CPT | Performed by: INTERNAL MEDICINE

## 2025-03-18 NOTE — PROGRESS NOTES
review all the pathology from the biopsies done on the previous endoscopies    Patient's PMH/PSH,SH,PSYCH Hx, MEDs, ALLERGIES, and ROS were all reviewed and updated in the appropriate sections.    PAST MEDICAL HISTORY:  Past Medical History:   Diagnosis Date    Anxiety 10/25/2021    GERD (gastroesophageal reflux disease)     Hemorrhagic ovarian cyst 05/30/2019    History of ventricular tachycardia 10/01/2020    Hypothyroidism 2009    had thyroid radiation, was hyperthyroid, checked for celiac disease on 4/15/14 at [facility]    Neoplasm of uncertain behavior of skin 10/20/2020    Nonsustained ventricular tachycardia (HCC) 08/30/2023    RUQ abdominal pain 04/07/2015    Toxic multinodul goiter 2010    had iodine 131 therapy     Wound, open, scalp with complication, subsequent encounter 11/18/2014       Past Surgical History:   Procedure Laterality Date    COSMETIC SURGERY  [id number]    scalp wound    IR BIOPSY THYROID PERC CORE NEEDLE  3/9/2023    IR BIOPSY THYROID PERC CORE NEEDLE [facility] SPECIAL PROCEDURES    TONSILLECTOMY  1998    URETHROPLASTY  1998    ureathal reimplantation    US BIOPSY LIVER PERCUTANEOUS  1/3/2025    US BIOPSY LIVER PERCUTANEOUS 1/3/2025 [facility] ULTRASOUND       CURRENT MEDICATIONS:    Current Outpatient Medications:     levothyroxine (SYNTHROID) 25 MCG tablet, take 1 tablet by mouth EVERY 7 DAYS (ON SUNDAYS) TOGETHER WITH 88MCG TO = A TOTAL OF 113MCG ONLY ON SUNDAYS. ALL OTHER DAYS TAKE 88MCG, Disp: 12 tablet, Rfl: 3    Iron-Vitamin C (IRON 100/C) 100-250 MG TABS, , Disp: , Rfl:     DULoxetine (CYMBALTA) 30 MG extended release capsule, Take 1 capsule by mouth daily, Disp: 90 capsule, Rfl: 3    Deucravacitinib (SOTYKTU) 6 MG TABS, , Disp: , Rfl:     levothyroxine (SYNTHROID) 88 MCG tablet, Take 1 tablet by mouth daily, Disp: 90 tablet, Rfl: 3    Magnesium Oxide (MAGNESIUM-OXIDE) 250 MG TABS tablet, Take 1 tablet by mouth Daily with supper, Disp: 90 tablet, Rfl: 0    ALLERGIES:   Allergies

## 2025-03-27 ENCOUNTER — PATIENT MESSAGE (OUTPATIENT)
Dept: FAMILY MEDICINE CLINIC | Age: 35
End: 2025-03-27

## 2025-03-27 DIAGNOSIS — E03.9 ACQUIRED HYPOTHYROIDISM: ICD-10-CM

## 2025-03-27 DIAGNOSIS — E04.1 THYROID NODULE: Primary | ICD-10-CM

## 2025-04-04 ENCOUNTER — HOSPITAL ENCOUNTER (OUTPATIENT)
Dept: ULTRASOUND IMAGING | Age: 35
Discharge: HOME | End: 2025-04-04
Payer: COMMERCIAL

## 2025-04-04 DIAGNOSIS — E03.9 ACQUIRED HYPOTHYROIDISM: ICD-10-CM

## 2025-04-04 DIAGNOSIS — E04.1 THYROID NODULE: ICD-10-CM

## 2025-04-04 PROCEDURE — 76536 US EXAM OF HEAD AND NECK: CPT

## 2025-04-06 ENCOUNTER — RESULTS FOLLOW-UP (OUTPATIENT)
Dept: FAMILY MEDICINE CLINIC | Age: 35
End: 2025-04-06

## 2025-04-06 NOTE — RESULT ENCOUNTER NOTE
Please notify patient    Ultrasound of the thyroid does not show any thyroid nodules, continue current treatment with Synthroid      Future Appointments  9/5/2025   9:15 AM    Silvia Lane MD    fp sc               [facility] DEP

## 2025-08-04 DIAGNOSIS — E03.9 ACQUIRED HYPOTHYROIDISM: ICD-10-CM

## 2025-08-04 RX ORDER — LEVOTHYROXINE SODIUM 88 UG/1
88 TABLET ORAL DAILY
Qty: 90 TABLET | Refills: 3 | Status: SHIPPED | OUTPATIENT
Start: 2025-08-04